# Patient Record
Sex: FEMALE | Race: WHITE | Employment: UNEMPLOYED | URBAN - NONMETROPOLITAN AREA
[De-identification: names, ages, dates, MRNs, and addresses within clinical notes are randomized per-mention and may not be internally consistent; named-entity substitution may affect disease eponyms.]

---

## 2020-07-24 ENCOUNTER — OFFICE VISIT (OUTPATIENT)
Dept: PODIATRY | Age: 75
End: 2020-07-24
Payer: MEDICARE

## 2020-07-24 VITALS
WEIGHT: 280 LBS | SYSTOLIC BLOOD PRESSURE: 118 MMHG | BODY MASS INDEX: 42.44 KG/M2 | HEART RATE: 65 BPM | DIASTOLIC BLOOD PRESSURE: 64 MMHG | HEIGHT: 68 IN

## 2020-07-24 PROCEDURE — 3046F HEMOGLOBIN A1C LEVEL >9.0%: CPT | Performed by: PODIATRIST

## 2020-07-24 PROCEDURE — G8428 CUR MEDS NOT DOCUMENT: HCPCS | Performed by: PODIATRIST

## 2020-07-24 PROCEDURE — 2022F DILAT RTA XM EVC RTNOPTHY: CPT | Performed by: PODIATRIST

## 2020-07-24 PROCEDURE — G8417 CALC BMI ABV UP PARAM F/U: HCPCS | Performed by: PODIATRIST

## 2020-07-24 PROCEDURE — 4040F PNEUMOC VAC/ADMIN/RCVD: CPT | Performed by: PODIATRIST

## 2020-07-24 PROCEDURE — G8400 PT W/DXA NO RESULTS DOC: HCPCS | Performed by: PODIATRIST

## 2020-07-24 PROCEDURE — 4004F PT TOBACCO SCREEN RCVD TLK: CPT | Performed by: PODIATRIST

## 2020-07-24 PROCEDURE — 1123F ACP DISCUSS/DSCN MKR DOCD: CPT | Performed by: PODIATRIST

## 2020-07-24 PROCEDURE — 3017F COLORECTAL CA SCREEN DOC REV: CPT | Performed by: PODIATRIST

## 2020-07-24 PROCEDURE — 1090F PRES/ABSN URINE INCON ASSESS: CPT | Performed by: PODIATRIST

## 2020-07-24 PROCEDURE — 11721 DEBRIDE NAIL 6 OR MORE: CPT | Performed by: PODIATRIST

## 2020-07-24 PROCEDURE — 99203 OFFICE O/P NEW LOW 30 MIN: CPT | Performed by: PODIATRIST

## 2020-07-24 RX ORDER — ALBUTEROL SULFATE 2.5 MG/3ML
2.5 SOLUTION RESPIRATORY (INHALATION) EVERY 6 HOURS PRN
COMMUNITY

## 2020-07-24 RX ORDER — FLUTICASONE PROPIONATE 220 UG/1
1 AEROSOL, METERED RESPIRATORY (INHALATION) 2 TIMES DAILY
COMMUNITY
Start: 2019-11-22

## 2020-07-24 RX ORDER — BUSPIRONE HYDROCHLORIDE 10 MG/1
10 TABLET ORAL 3 TIMES DAILY
COMMUNITY

## 2020-07-24 RX ORDER — ATORVASTATIN CALCIUM 20 MG/1
20 TABLET, FILM COATED ORAL DAILY
COMMUNITY

## 2020-07-24 RX ORDER — METOPROLOL TARTRATE 50 MG/1
50 TABLET, FILM COATED ORAL 2 TIMES DAILY
COMMUNITY

## 2020-07-24 RX ORDER — ALENDRONATE SODIUM 70 MG/1
70 TABLET ORAL WEEKLY
COMMUNITY
Start: 2019-10-24

## 2020-07-24 RX ORDER — LISINOPRIL 2.5 MG/1
2.5 TABLET ORAL DAILY
COMMUNITY

## 2020-07-24 RX ORDER — HUMAN INSULIN 100 [IU]/ML
INJECTION, SOLUTION SUBCUTANEOUS
COMMUNITY
Start: 2020-04-22

## 2020-07-24 RX ORDER — GABAPENTIN 300 MG/1
300 CAPSULE ORAL 2 TIMES DAILY
COMMUNITY

## 2020-07-24 RX ORDER — AMLODIPINE BESYLATE 5 MG/1
5 TABLET ORAL DAILY
COMMUNITY

## 2020-07-24 RX ORDER — ESCITALOPRAM OXALATE 10 MG/1
20 TABLET ORAL DAILY
COMMUNITY
Start: 2019-10-23

## 2020-07-24 RX ORDER — HYDROCHLOROTHIAZIDE 25 MG/1
25 TABLET ORAL DAILY
COMMUNITY

## 2020-07-24 NOTE — PROGRESS NOTES
Provider, MD   NOVOLIN R 100 UNIT/ML injection  4/22/20  Yes Historical Provider, MD   alendronate (FOSAMAX) 70 MG tablet Take 70 mg by mouth once a week 10/24/19  Yes Historical Provider, MD   Multiple Vitamins-Minerals (MULTIVITAMIN ADULT PO) Take 1 tablet by mouth daily   Yes Historical Provider, MD       Past Surgical History:   Procedure Laterality Date    CARPAL TUNNEL RELEASE Bilateral 2017    FINGER SURGERY Left     left thumb surgery    HEMORRHOID SURGERY      TOTAL HIP ARTHROPLASTY Right 2011    TOTAL KNEE ARTHROPLASTY Left 2015    WRIST SURGERY Right        History reviewed. No pertinent family history. Social History     Tobacco Use    Smoking status: Not on file    Smokeless tobacco: Never Used   Substance Use Topics    Alcohol use: Not on file       ROS: All 14 ROS systems reviewed and pertinent positives noted above, all others negative. Lower Extremity Physical Examination:     Vitals:   Vitals:    07/24/20 0943   BP: 118/64   Pulse: 65     General: AAO x 3 in NAD. Vascular: DP and PT pulses palpable 2/4, bilateral.  CFT <3 seconds, bilateral.  Hair growth absent to the level of the digits, bilateral.  Edema present, bilateral.  Varicosities present, bilateral. Erythema absent, bilateral. Distal Rubor absent bilateral.  Temperature decreased bilateral. Hyperpigmentation present bilateral. Atrophic skin no  Neurological: Sensation Impaired to light touch to level of digits, bilateral.  Protective sensation intact  10/10 sites via 5.07/10g [place] Monofilament, bilateral.  negative Tinel's, bilateral.  negative Valleix sign, bilateral.  Vibratory abnormal  bilateral.  Reflexes Decreased bilateral.  Paresthesias positive. Dysthesias negative.   Sharp/dull abnormal bilateral.  Musculoskeletal: Muscle strength 5/5, bilateral.  Pain absent upon palpation bilateral. Normal medial longitudinal arch, bilateral.  Ankle ROM decresed,bilateral.  1st MPJ ROM within normal limits, bilateral.  Dorsally contracted digits present. No other foot deformities. Integument:   Open lesion present, Right. superficial wound distal R hallux nail bed, non infected with new epithelail skin on edges. Interdigital maceration absent to web spaces absent, Bilateral.  Nails left 1, 2 and right 1, 2 thickened, dystrophic and crumbly, discolored with subungual debris. Fissures absent, Bilateral. Hyperkeratotic tissue is absent. Asessment: Patient is a 77 y.o. female with:    Diagnosis Orders   1. DM (diabetes mellitus), type 2 with neurological complications ([facility] [address]     2. Dermatophytosis of nail     3. Chronic venous insufficiency     4. Hammer toe, unspecified laterality     5. Open wound of toe, initial encounter         Plan: Patient examined and evaluated. Current condition and treatment options discussed in detail. DM foot ed and exam  Patient will use compression stockings on a regular basis. Any increase in swelling or redness or signs of ulceration will be seen back for further compression wrap therapy. Also advised importance of continued elevation of the leg. All nails as mentioned above debrided in length and thickness. Patient advised OTC methods for treatment of the mycotic nails. Patient will follow up in 10 weeks. Continue abx ointment OTC to R hallux wound qd. Looks to be healing well, see back in 1-2 weeks if not resolved. Offloading options and appropriate shoe gear for hammertoe deformity discussed. Contact office with any questions/problems/concerns.

## 2020-07-24 NOTE — PROGRESS NOTES
Foot Care Worksheet  PCP: Kamron Huston NP  Last visit: 07/13/2020    Nail description:  Thick , Yellow , Crumbly , Marked limitation of ambulation     Pain resulting from thickened and dystrophy of nail plate No    Nails involved  Right   1, 2  (T5-T9)  Left     1, 2  (TA-T4)    Q7 1 Class A Finding - Non traumatic amputation of foot No    Q8 2 Class B Findings - Absent DP pulse No, Absent PT pulse No, Advanced tropic changes (3 required) Yes    Decrease hair growth Yes, Nail changes/thickening Yes, Pigmented changes/discoloration Yes, Skin texture (thin, shiny) No, Skin color (rubor/redness) No  NoQ9 1 Class B and 2 Class C Findings  Claudication No, Temperature change Yes, Paresthesia Yes, Burning No, Edema Yes

## 2021-08-15 PROBLEM — M00.9 SEPTIC ARTHRITIS OF HIP (HCC): Status: ACTIVE | Noted: 2021-08-15

## 2022-09-04 LAB
ANION GAP SERPL CALCULATED.3IONS-SCNC: 11.7 MMOL/L
BACTERIA, URINE: ABNORMAL
BASOPHILS %: 1.22 (ref 0–3)
BASOPHILS ABSOLUTE: 0.13 (ref 0–0.3)
BILIRUBIN URINE: NEGATIVE
BLOOD, URINE: NEGATIVE
BUN BLDV-MCNC: 40 MG/DL (ref 7–17)
CALCIUM SERPL-MCNC: 8.5 MG/DL (ref 8.4–10.2)
CASTS UA: ABNORMAL
CHLORIDE BLD-SCNC: 96 MMOL/L (ref 98–120)
CLARITY: CLEAR
CO2: 30 MMOL/L (ref 22–31)
COLOR, URINE: YELLOW
CREAT SERPL-MCNC: 1.7 MG/DL (ref 0.5–1)
CREATINE KINASE-MB INDEX: 0.4 % (ref 0–5)
CREATINE KINASE-MB: <0.22 NG/ML (ref 0–2.4)
CREATINE KINASE: 56 UNITS/L (ref 25–170)
CREATININE CLEARANCE: 28.4
CRYSTALS, UA: ABNORMAL
EOSINOPHILS %: 3.29 (ref 0–10)
EOSINOPHILS ABSOLUTE: 0.34 (ref 0–1.1)
GFR CALCULATED: 31.1
GLUCOSE URINE: ABNORMAL MG/DL
GLUCOSE: 414 MG/DL (ref 65–105)
HCT VFR BLD CALC: 34.7 % (ref 37–47)
HEMOGLOBIN: 11.5 (ref 12–16)
KETONES, URINE: NEGATIVE MG/DL
LEUKOCYTE ESTERASE, URINE: NEGATIVE
LYMPHOCYTE %: 12.45 (ref 20–51.1)
LYMPHOCYTES ABSOLUTE: 1.28 (ref 1–5.5)
MAGNESIUM: 2.3 MG/DL (ref 1.6–2.3)
MCH RBC QN AUTO: 31.1 PG (ref 28.5–32.5)
MCHC RBC AUTO-ENTMCNC: 33.2 G/DL (ref 32–37)
MCV RBC AUTO: 93.7 FL (ref 80–94)
MONOCYTES %: 7.95 (ref 1.7–9.3)
MONOCYTES ABSOLUTE: 0.82 (ref 0.1–1)
MUCUS, URINE: ABNORMAL
MYOGLOBIN: 60.9 NG/ML (ref 20–100)
NEUTROPHILS %: 75.09 (ref 42.2–75.2)
NEUTROPHILS ABSOLUTE: 7.71 (ref 2–8.1)
NITRITE, URINE: NEGATIVE
PDW BLD-RTO: 13.9 % (ref 8.5–15.5)
PH UA: 6.5 (ref 5–8.5)
PLATELET # BLD: 242.5 THOU/MM3 (ref 130–400)
POTASSIUM SERPL-SCNC: 3.7 MMOL/L (ref 3.6–5)
PROTEIN UA: NEGATIVE MG/DL
RBC URINE: ABNORMAL (ref 0–2)
RBC: 3.7 M/UL (ref 4.2–5.4)
SODIUM BLD-SCNC: 134 MMOL/L (ref 135–145)
SPECIFIC GRAVITY, URINE: 1 MG/DL (ref 1–1.03)
SQUAMOUS EPITHELIAL: ABNORMAL
T4 FREE: 1.07 NG/DL (ref 0.78–2.19)
TRICHOMONAS, URINE: ABNORMAL
TROPONIN I: < 0.012 NG/ML (ref 0–0.03)
TSH REFLEX FT4: 0.32 MIU/ML (ref 0.49–4.67)
UROBILINOGEN, URINE: 0.2 MG/DL (ref 0.2–1)
WBC URINE: ABNORMAL (ref 0–4)
WBC: 10.3 THOU/ML3 (ref 4.8–10.8)
YEAST, URINE: ABNORMAL

## 2023-01-18 ENCOUNTER — OUTSIDE SERVICES (OUTPATIENT)
Dept: INTERNAL MEDICINE | Age: 78
End: 2023-01-18

## 2023-01-18 DIAGNOSIS — J45.909 UNCOMPLICATED ASTHMA, UNSPECIFIED ASTHMA SEVERITY, UNSPECIFIED WHETHER PERSISTENT: ICD-10-CM

## 2023-01-18 DIAGNOSIS — E78.5 HYPERLIPIDEMIA, UNSPECIFIED HYPERLIPIDEMIA TYPE: Primary | ICD-10-CM

## 2023-01-18 DIAGNOSIS — I25.10 CORONARY ARTERY DISEASE, UNSPECIFIED VESSEL OR LESION TYPE, UNSPECIFIED WHETHER ANGINA PRESENT, UNSPECIFIED WHETHER NATIVE OR TRANSPLANTED HEART: ICD-10-CM

## 2023-01-18 DIAGNOSIS — F32.9 MAJOR DEPRESSIVE DISORDER, REMISSION STATUS UNSPECIFIED, UNSPECIFIED WHETHER RECURRENT: ICD-10-CM

## 2023-01-18 DIAGNOSIS — N18.9 CHRONIC KIDNEY DISEASE, UNSPECIFIED CKD STAGE: ICD-10-CM

## 2023-01-18 DIAGNOSIS — H26.9 CATARACT, UNSPECIFIED CATARACT TYPE, UNSPECIFIED LATERALITY: ICD-10-CM

## 2023-01-21 PROBLEM — F32.9 MAJOR DEPRESSIVE DISORDER: Status: ACTIVE | Noted: 2023-01-21

## 2023-01-21 PROBLEM — E78.5 HYPERLIPIDEMIA: Status: ACTIVE | Noted: 2023-01-21

## 2023-01-21 PROBLEM — J45.909 UNCOMPLICATED ASTHMA: Status: ACTIVE | Noted: 2023-01-21

## 2023-01-21 PROBLEM — N18.9 CHRONIC KIDNEY DISEASE: Status: ACTIVE | Noted: 2023-01-21

## 2023-01-21 PROBLEM — I25.10 CORONARY ARTERY DISEASE: Status: ACTIVE | Noted: 2023-01-21

## 2023-01-21 PROBLEM — H26.9 CATARACT: Status: ACTIVE | Noted: 2023-01-21

## 2023-01-22 NOTE — PROGRESS NOTES
Date of Admission: 1/16/2023  Date of Encounter: 1/18/2023  Patient:  Yan Pepe  YOB: 1945      Chief Complaint: Admission to [facility] after treatment in the hospital for TIARRA    History of Present Illness:  She previously lived in assisted living, and experienced frequent falls, it seems that one of them passed quite significantly to her knee, and therefore she was transferred to the hospital, and was found to have TIARRA as well as hyponatremia. She is being admitted to [facility] for further rehabilitation. She seems to doing okay. Says that she still has pain in her knees, but it is better than before. Denies fever, chills. No falls since her admission to nursing home    Past Medical History: Hyperlipidemia, CKD, asthma  Past Family History: Noncontributory  Social History: Non-smoker, nondrinker at this time    Review of Systems:  Constitutional: Denies fever, chills  Cardiac: Denies chest pain, palpitations  Except as dictated above, all other systems reviewed and are negative     Physical Exam  Vitals: Blood pressure 132/75. Pulse 80. Respirations 15. Temperature 98.2  Constitutional: No acute distress  Eyes: No lid lag or proptosis. PERRLA  HENT: External ears normal. No lesions on the lips  Respiratory: Good air entry bilaterally. No wheezing or crackles  Cardiovascular: Normal S1-S2. No lower extremity edema  Gastrointestinal: No visible veins or masses. Good bowel sounds  Skin: No abnormal rashes. No digital cyanosis  Neurologic: Cranial nerves grossly intact. Sensation grossly intact  Psychiatric: Alert. Normal Affect. Assessments   Diagnosis Orders   1. Hyperlipidemia, unspecified hyperlipidemia type        2. Chronic kidney disease, unspecified CKD stage        3. Uncomplicated asthma, unspecified asthma severity, unspecified whether persistent        4.  Major depressive disorder, remission status unspecified, unspecified whether recurrent   5. Cataract, unspecified cataract type, unspecified laterality        6. Coronary artery disease, unspecified vessel or lesion type, unspecified whether angina present, unspecified whether native or transplanted heart            Plan  We will continue current medications unless otherwise noted.  I will be notified of any changes to the patient's condition.      This note has been created using the Epic electronic health record, and dictated in part by [employer] One dictation system. Despite the documenting physician's best efforts, there may be errors in spelling, grammar or syntax.

## 2023-02-15 ENCOUNTER — OUTSIDE SERVICES (OUTPATIENT)
Dept: INTERNAL MEDICINE | Age: 78
End: 2023-02-15
Payer: MEDICARE

## 2023-02-15 DIAGNOSIS — J45.909 UNCOMPLICATED ASTHMA, UNSPECIFIED ASTHMA SEVERITY, UNSPECIFIED WHETHER PERSISTENT: ICD-10-CM

## 2023-02-15 DIAGNOSIS — F32.9 MAJOR DEPRESSIVE DISORDER, REMISSION STATUS UNSPECIFIED, UNSPECIFIED WHETHER RECURRENT: ICD-10-CM

## 2023-02-15 DIAGNOSIS — E78.5 HYPERLIPIDEMIA, UNSPECIFIED HYPERLIPIDEMIA TYPE: Primary | ICD-10-CM

## 2023-02-15 DIAGNOSIS — N18.9 CHRONIC KIDNEY DISEASE, UNSPECIFIED CKD STAGE: ICD-10-CM

## 2023-02-15 DIAGNOSIS — I25.10 CORONARY ARTERY DISEASE, UNSPECIFIED VESSEL OR LESION TYPE, UNSPECIFIED WHETHER ANGINA PRESENT, UNSPECIFIED WHETHER NATIVE OR TRANSPLANTED HEART: ICD-10-CM

## 2023-02-15 DIAGNOSIS — H26.9 CATARACT, UNSPECIFIED CATARACT TYPE, UNSPECIFIED LATERALITY: ICD-10-CM

## 2023-02-15 PROCEDURE — 99309 SBSQ NF CARE MODERATE MDM 30: CPT | Performed by: INTERNAL MEDICINE

## 2023-02-16 NOTE — PROGRESS NOTES
Date of Encounter: 2/15/2023  Patient:  Fatimah Jimenez  YOB: 1945      Chief Complaint: Urinary incontinence    History of Present Illness:  She continues to have urinary incontinence, denies fever, dysuria, hematuria. Denies flank pain, nausea, vomiting. Past Medical/Surgical/Social History: Per admission note    Physical Exam  Vitals: Blood pressure 118/84. Pulse 84. Respirations 20. Temperature 97.3  Constitutional: No acute distress  Eyes: Sclerae nonicteric. No lid lag or proptosis  HENT: External ears normal. No external lesions on the nose. No lesions on the lips  Neck: No gross masses. Trachea visibly midline  Respiratory: Good air entry bilaterally. No wheezing or crackles  Cardiovascular: Normal S1-S2. No murmurs  Gastrointestinal: No visible masses. Skin: No abnormal rashes. No digital cyanosis  Neurologic: Cranial nerves grossly intact    Assessments   Diagnosis Orders   1. Hyperlipidemia, unspecified hyperlipidemia type        2. Chronic kidney disease, unspecified CKD stage        3. Uncomplicated asthma, unspecified asthma severity, unspecified whether persistent        4. Major depressive disorder, remission status unspecified, unspecified whether recurrent        5. Cataract, unspecified cataract type, unspecified laterality        6. Coronary artery disease, unspecified vessel or lesion type, unspecified whether angina present, unspecified whether native or transplanted heart            Plan  We will order UA to assess for infection  We will continue current medications unless otherwise noted. I will be notified of any changes to the patient's condition. This note has been created using the [employer] health record, and dictated in part by [employer] dictation system. Despite the documenting physician's best efforts, there may be errors in spelling, grammar or syntax.

## 2023-02-17 LAB
BACTERIA, URINE: ABNORMAL /HPF
BILIRUBIN URINE: NEGATIVE
BLOOD, URINE: ABNORMAL
CASTS UA: ABNORMAL /LPF
CLARITY: CLEAR
COLOR, URINE: YELLOW
CRYSTALS, UA: ABNORMAL
GLUCOSE URINE: ABNORMAL MG/DL
KETONES, URINE: NEGATIVE MG/DL
LEUKOCYTE ESTERASE, URINE: ABNORMAL
NITRITE, URINE: NEGATIVE
PH UA: 6.5 (ref 5–8.5)
PROTEIN UA: NEGATIVE MG/DL
RBC URINE: ABNORMAL /HPF (ref 0–2)
SPECIFIC GRAVITY, URINE: 1 MG/DL (ref 1–1.03)
SQUAMOUS EPITHELIAL: ABNORMAL /HPF
UROBILINOGEN, URINE: 0.2 MG/DL (ref 0.2–1)
WBC URINE: ABNORMAL /HPF (ref 0–4)

## 2023-03-15 ENCOUNTER — OUTSIDE SERVICES (OUTPATIENT)
Dept: INTERNAL MEDICINE | Age: 78
End: 2023-03-15

## 2023-03-22 ENCOUNTER — OUTSIDE SERVICES (OUTPATIENT)
Dept: INTERNAL MEDICINE | Age: 78
End: 2023-03-22

## 2023-03-22 DIAGNOSIS — F32.9 MAJOR DEPRESSIVE DISORDER, REMISSION STATUS UNSPECIFIED, UNSPECIFIED WHETHER RECURRENT: ICD-10-CM

## 2023-03-22 DIAGNOSIS — E78.5 HYPERLIPIDEMIA, UNSPECIFIED HYPERLIPIDEMIA TYPE: Primary | ICD-10-CM

## 2023-03-22 DIAGNOSIS — H26.9 CATARACT, UNSPECIFIED CATARACT TYPE, UNSPECIFIED LATERALITY: ICD-10-CM

## 2023-03-22 DIAGNOSIS — L89.90 PRESSURE INJURY OF SKIN, UNSPECIFIED INJURY STAGE, UNSPECIFIED LOCATION: ICD-10-CM

## 2023-03-22 DIAGNOSIS — I25.10 CORONARY ARTERY DISEASE, UNSPECIFIED VESSEL OR LESION TYPE, UNSPECIFIED WHETHER ANGINA PRESENT, UNSPECIFIED WHETHER NATIVE OR TRANSPLANTED HEART: ICD-10-CM

## 2023-03-22 DIAGNOSIS — J45.909 UNCOMPLICATED ASTHMA, UNSPECIFIED ASTHMA SEVERITY, UNSPECIFIED WHETHER PERSISTENT: ICD-10-CM

## 2023-03-22 DIAGNOSIS — N18.9 CHRONIC KIDNEY DISEASE, UNSPECIFIED CKD STAGE: ICD-10-CM

## 2023-03-26 NOTE — PROGRESS NOTES
attempt to order a new mattress. I counseled regarding the  need to move around to relieve the pressure. We will order Tylenol 1000 mg 3 times daily      This note has been created using the [employer] electronic health record, and dictated in part by [employer] dictation system. Despite the documenting physician's best efforts, there may be errors in spelling, grammar or syntax.

## 2023-05-05 LAB
ALBUMIN/GLOBULIN RATIO: 1.21 G/DL
ALBUMIN: 3.4 G/DL (ref 3.5–5)
ALP BLD-CCNC: 129 UNITS/L (ref 38–126)
ALT SERPL-CCNC: 12 UNITS/L (ref 4–35)
ANION GAP SERPL CALCULATED.3IONS-SCNC: 9.7 MMOL/L
AST SERPL-CCNC: 17 UNITS/L (ref 14–36)
BASOPHILS %: 1 (ref 0–3)
BASOPHILS ABSOLUTE: 0.08 (ref 0–0.3)
BILIRUB SERPL-MCNC: 0.7 MG/DL (ref 0.2–1.3)
BUN BLDV-MCNC: 17 MG/DL (ref 7–17)
CALCIUM SERPL-MCNC: 8.4 MG/DL (ref 8.4–10.2)
CHLORIDE BLD-SCNC: 94 MMOL/L (ref 98–120)
CHOLESTEROL/HDL RATIO: 2.37 RATIO (ref 0–4.5)
CHOLESTEROL: 116 MG/DL (ref 50–200)
CO2: 25 MMOL/L (ref 22–31)
CREAT SERPL-MCNC: 1.1 MG/DL (ref 0.5–1)
EOSINOPHILS %: 4.8 (ref 0–10)
EOSINOPHILS ABSOLUTE: 0.38 (ref 0–1.1)
GFR CALCULATED: 51.2
GLOBULIN: 2.8 G/DL
GLUCOSE: 220 MG/DL (ref 65–105)
HBA1C MFR BLD: 10.2 % (ref 4.4–6.4)
HCT VFR BLD CALC: 29.7 % (ref 37–47)
HDLC SERPL-MCNC: 49 MG/DL (ref 36–68)
HEMOGLOBIN: 9.9 (ref 12–16)
LDL CHOLESTEROL CALCULATED: 51.4 MG/DL (ref 0–160)
LYMPHOCYTE %: 14.41 (ref 20–51.1)
LYMPHOCYTES ABSOLUTE: 1.13 (ref 1–5.5)
MCH RBC QN AUTO: 29.2 PG (ref 28.5–32.5)
MCHC RBC AUTO-ENTMCNC: 33.4 G/DL (ref 32–37)
MCV RBC AUTO: 87.4 FL (ref 80–94)
MONOCYTES %: 11.5 (ref 1.7–9.3)
MONOCYTES ABSOLUTE: 0.91 (ref 0.1–1)
NEUTROPHILS %: 68.3 (ref 42.2–75.2)
NEUTROPHILS ABSOLUTE: 5.37 (ref 2–8.1)
PDW BLD-RTO: 14.3 % (ref 8.5–15.5)
PLATELET # BLD: 282.1 THOU/MM3 (ref 130–400)
POTASSIUM SERPL-SCNC: 4.7 MMOL/L (ref 3.6–5)
RBC: 3.4 M/UL (ref 4.2–5.4)
SODIUM BLD-SCNC: 124 MMOL/L (ref 135–145)
TOTAL PROTEIN, SERUM: 6.2 G/DL (ref 6.3–8.2)
TRIGL SERPL-MCNC: 78 MG/DL (ref 10–250)
VLDLC SERPL CALC-MCNC: 16 MG/DL (ref 0–50)
WBC: 7.9 THOU/ML3 (ref 4.8–10.8)

## 2023-05-17 ENCOUNTER — OUTSIDE SERVICES (OUTPATIENT)
Dept: INTERNAL MEDICINE | Age: 78
End: 2023-05-17

## 2023-05-17 DIAGNOSIS — H26.9 CATARACT, UNSPECIFIED CATARACT TYPE, UNSPECIFIED LATERALITY: ICD-10-CM

## 2023-05-17 DIAGNOSIS — J45.909 UNCOMPLICATED ASTHMA, UNSPECIFIED ASTHMA SEVERITY, UNSPECIFIED WHETHER PERSISTENT: ICD-10-CM

## 2023-05-17 DIAGNOSIS — I25.10 CORONARY ARTERY DISEASE, UNSPECIFIED VESSEL OR LESION TYPE, UNSPECIFIED WHETHER ANGINA PRESENT, UNSPECIFIED WHETHER NATIVE OR TRANSPLANTED HEART: ICD-10-CM

## 2023-05-17 DIAGNOSIS — E78.5 HYPERLIPIDEMIA, UNSPECIFIED HYPERLIPIDEMIA TYPE: Primary | ICD-10-CM

## 2023-05-17 DIAGNOSIS — N18.9 CHRONIC KIDNEY DISEASE, UNSPECIFIED CKD STAGE: ICD-10-CM

## 2023-05-17 DIAGNOSIS — F32.9 MAJOR DEPRESSIVE DISORDER, REMISSION STATUS UNSPECIFIED, UNSPECIFIED WHETHER RECURRENT: ICD-10-CM

## 2023-05-19 NOTE — PROGRESS NOTES
Date of Encounter: 5/17/2023  Patient:  Maury Philippe  YOB: 1945      Chief Complaint: Diabetes    History of Present Illness:  She recently had an A1c level done, which came back at 10.2. Dose insulin was increased. She has no complaints. No issues reported by nursing staff. Past Medical/Surgical/Social History: Per admission note    Physical Exam  Vitals: Blood pressure 119/76. Pulse 69. Temperature 98. Respirations 18  Constitutional: No acute distress  Eyes: Sclerae nonicteric. No lid lag or proptosis  HENT: External ears normal. No external lesions on the nose. No lesions on the lips  Neck: No gross masses. Trachea visibly midline  Respiratory: Good air entry bilaterally. No wheezing or crackles  Cardiovascular: Normal S1-S2. No murmurs  Gastrointestinal: No visible masses. Skin: No abnormal rashes. No digital cyanosis  Neurologic: Cranial nerves grossly intact    Assessments   Diagnosis Orders   1. Hyperlipidemia, unspecified hyperlipidemia type        2. Chronic kidney disease, unspecified CKD stage        3. Uncomplicated asthma, unspecified asthma severity, unspecified whether persistent        4. Major depressive disorder, remission status unspecified, unspecified whether recurrent        5. Coronary artery disease, unspecified vessel or lesion type, unspecified whether angina present, unspecified whether native or transplanted heart        6. Cataract, unspecified cataract type, unspecified laterality            Plan  We will continue current medications unless otherwise noted. I will be notified of any changes to the patient's condition. This note has been created using the [employer] health record, and dictated in part by [employer] dictation system. Despite the documenting physician's best efforts, there may be errors in spelling, grammar or syntax.

## 2023-07-12 ENCOUNTER — OUTSIDE SERVICES (OUTPATIENT)
Dept: INTERNAL MEDICINE | Age: 78
End: 2023-07-12
Payer: MEDICARE

## 2023-07-12 DIAGNOSIS — H26.9 CATARACT, UNSPECIFIED CATARACT TYPE, UNSPECIFIED LATERALITY: ICD-10-CM

## 2023-07-12 DIAGNOSIS — N18.9 CHRONIC KIDNEY DISEASE, UNSPECIFIED CKD STAGE: ICD-10-CM

## 2023-07-12 DIAGNOSIS — E78.5 HYPERLIPIDEMIA, UNSPECIFIED HYPERLIPIDEMIA TYPE: Primary | ICD-10-CM

## 2023-07-12 DIAGNOSIS — J45.909 UNCOMPLICATED ASTHMA, UNSPECIFIED ASTHMA SEVERITY, UNSPECIFIED WHETHER PERSISTENT: ICD-10-CM

## 2023-07-12 DIAGNOSIS — I25.10 CORONARY ARTERY DISEASE, UNSPECIFIED VESSEL OR LESION TYPE, UNSPECIFIED WHETHER ANGINA PRESENT, UNSPECIFIED WHETHER NATIVE OR TRANSPLANTED HEART: ICD-10-CM

## 2023-07-12 DIAGNOSIS — F32.9 MAJOR DEPRESSIVE DISORDER, REMISSION STATUS UNSPECIFIED, UNSPECIFIED WHETHER RECURRENT: ICD-10-CM

## 2023-07-12 PROCEDURE — 99309 SBSQ NF CARE MODERATE MDM 30: CPT | Performed by: INTERNAL MEDICINE

## 2023-07-12 NOTE — PROGRESS NOTES
Date of Encounter: 7/12/2023  Patient:  Madhu Pablo  YOB: 1945      Chief Complaint: Sciatica    History of Present Illness:  She reports that she has pain in her back radiates down to her right thigh. Says that is going on for the last several months, but it is getting worse. Denies fever, chills, weight loss, bowel/bladder incontinence. No recent trauma. Past Medical/Surgical/Social History: Per admission note    Physical Exam  Vitals: Blood pressure 142/88. Pulse 75. Temperature 97.2. Respirations 18  Constitutional: No acute distress  Eyes: Sclerae nonicteric. No lid lag or proptosis  HENT: External ears normal. No external lesions on the nose. No lesions on the lips  Neck: No gross masses. Trachea visibly midline  Respiratory: Good air entry bilaterally. No wheezing or crackles  Cardiovascular: Normal S1-S2. No murmurs  Gastrointestinal: No visible masses. Skin: No abnormal rashes. No digital cyanosis  Neurologic: Cranial nerves grossly intact    Assessments   Diagnosis Orders   1. Hyperlipidemia, unspecified hyperlipidemia type        2. Chronic kidney disease, unspecified CKD stage        3. Uncomplicated asthma, unspecified asthma severity, unspecified whether persistent        4. Major depressive disorder, remission status unspecified, unspecified whether recurrent        5. Coronary artery disease, unspecified vessel or lesion type, unspecified whether angina present, unspecified whether native or transplanted heart        6. Cataract, unspecified cataract type, unspecified laterality            Plan  We will start gabapentin 100 mg nightly. Reassess next visit. Will increase dose gradually  I will be notified of any changes to the patient's condition. Unless otherwise noted, given the stability of the condition, a gradual dose reduction in medications is not indicated at this time.       This note has been created using the [employer] health record, and

## 2023-07-18 ENCOUNTER — OFFICE VISIT (OUTPATIENT)
Dept: NEPHROLOGY | Age: 78
End: 2023-07-18
Payer: MEDICARE

## 2023-07-18 VITALS
HEIGHT: 68 IN | SYSTOLIC BLOOD PRESSURE: 128 MMHG | WEIGHT: 293 LBS | HEART RATE: 68 BPM | DIASTOLIC BLOOD PRESSURE: 84 MMHG | BODY MASS INDEX: 44.41 KG/M2

## 2023-07-18 DIAGNOSIS — D63.8 ANEMIA OF CHRONIC DISEASE: Primary | ICD-10-CM

## 2023-07-18 DIAGNOSIS — N18.31 STAGE 3A CHRONIC KIDNEY DISEASE (HCC): ICD-10-CM

## 2023-07-18 DIAGNOSIS — E88.09 HYPOALBUMINEMIA: ICD-10-CM

## 2023-07-18 DIAGNOSIS — E87.1 HYPONATREMIA: ICD-10-CM

## 2023-07-18 PROCEDURE — 99214 OFFICE O/P EST MOD 30 MIN: CPT | Performed by: INTERNAL MEDICINE

## 2023-07-18 PROCEDURE — 1123F ACP DISCUSS/DSCN MKR DOCD: CPT | Performed by: INTERNAL MEDICINE

## 2023-07-18 PROCEDURE — 99204 OFFICE O/P NEW MOD 45 MIN: CPT | Performed by: INTERNAL MEDICINE

## 2023-07-18 RX ORDER — MIDODRINE HYDROCHLORIDE 5 MG/1
5 TABLET ORAL 3 TIMES DAILY
COMMUNITY

## 2023-07-18 RX ORDER — MAGNESIUM OXIDE 400 MG/1
TABLET ORAL
COMMUNITY
Start: 2021-12-02

## 2023-07-18 RX ORDER — ROPINIROLE 2 MG/1
2 TABLET, FILM COATED ORAL NIGHTLY
COMMUNITY

## 2023-07-18 RX ORDER — SPIRONOLACTONE 25 MG/1
25 TABLET ORAL DAILY
COMMUNITY

## 2023-07-18 RX ORDER — INSULIN ASPART 100 [IU]/ML
INJECTION, SOLUTION INTRAVENOUS; SUBCUTANEOUS
COMMUNITY

## 2023-07-18 RX ORDER — POTASSIUM CHLORIDE 1.5 G/1.58G
20 POWDER, FOR SOLUTION ORAL DAILY
COMMUNITY

## 2023-07-18 RX ORDER — INSULIN GLARGINE 100 [IU]/ML
INJECTION, SOLUTION SUBCUTANEOUS NIGHTLY
COMMUNITY

## 2023-07-18 RX ORDER — LOPERAMIDE HYDROCHLORIDE 2 MG/1
2 CAPSULE ORAL PRN
COMMUNITY

## 2023-07-18 NOTE — PROGRESS NOTES
Renal Consult Note    Patient :  Penny Merlin; 69 y.o. MRN# [id number]    Reason for Consult:     Asked to see the patient because of abnormal labs. History of Present Illness:     Penny Merlin; 69 y.o. female with past medical history as mentioned below. She has a history of type 2 diabetes mellitus and a history of skin cancer. She has had quite a bit of variability in her lab data in the past.  She appeared to have an acute kidney injury episode in September 2022 for unknown reason. Her creatinine was 2 during the admission and went back to 1.6 mg/DL post discharge. Lab data from 5/5/2023 show sodium 124, potassium 4.7 chloride 94 and CO2 25 with a BUN of 17 and creatinine 1.1 with glucose 220. Calcium is 8.4. Liver function test within normal limits except for albumin and a bit low at 3.4 and alkaline phosphatase a bit high at 129. CBC showed white blood cell 7.9 with hemoglobin 9.9 and platelets 289. Lymphocyte percentage was low at 14.41 and monocyte percentage was high at 11.5 with neutrophil percentage 68.3 and eosinophils 4.8. Those labs were done at [facility].    Other past medical history includes morbid obesity with BMI 46.5 kg/m2, hypertension, hyperlipidemia, bone mineral disorder osteoporosis versus osteopenia, depression and apparently some issues with hypotension based on the fact the patient uses midodrine. Weight on this visit is 306 pounds up from 280 pounds measured 3 years ago. The patient is on numerous medications including magnesium oxide, loperamide, midodrine, spironolactone, Requip, potassium chloride, insulin, AER O inhaler, vitamin C, low-dose aspirin, albuterol, BuSpar, Lopressor, Lipitor, Lexapro, gabapentin, Fosamax and multivitamin. There are other medications listed including Flovent inhalation, HCTZ 25 mg oral daily, amlodipine 5 mg oral daily and lisinopril 2.5 mg oral daily that have all been discontinued.     Allergies to adhesive tape and

## 2023-08-17 LAB
ALBUMIN: 3.5 G/DL (ref 3.5–5)
ANION GAP SERPL CALCULATED.3IONS-SCNC: 14.2 MMOL/L (ref 4–12)
BASOPHILS %: 0.89 (ref 0–3)
BASOPHILS ABSOLUTE: 0.07 (ref 0–0.3)
BUN BLDV-MCNC: 22 MG/DL (ref 7–17)
CALCIUM SERPL-MCNC: 8.5 MG/DL (ref 8.4–10.2)
CHLORIDE BLD-SCNC: 92 MMOL/L (ref 98–120)
CO2: 24 MMOL/L (ref 22–31)
CREAT SERPL-MCNC: 1 MG/DL (ref 0.5–1)
EOSINOPHILS %: 5.17 (ref 0–10)
EOSINOPHILS ABSOLUTE: 0.38 (ref 0–1.1)
FERRITIN: 27.8 NG/DL (ref 10–282)
GFR CALCULATED: 57.1
GLUCOSE: 101 MG/DL (ref 65–105)
HCT VFR BLD CALC: 30.8 % (ref 37–47)
HEMOGLOBIN: 9 (ref 12–16)
IRON SATURATION: 11 % (ref 15–38)
IRON: 40 MG/DL (ref 37–170)
LYMPHOCYTE %: 14.76 (ref 20–51.1)
LYMPHOCYTES ABSOLUTE: 1.08 (ref 1–5.5)
MCH RBC QN AUTO: 26.3 PG (ref 28.5–32.5)
MCHC RBC AUTO-ENTMCNC: 29.3 G/DL (ref 32–37)
MCV RBC AUTO: 89.8 FL (ref 80–94)
MONOCYTES %: 10.17 (ref 1.7–9.3)
MONOCYTES ABSOLUTE: 0.75 (ref 0.1–1)
NEUTROPHILS %: 69.01 (ref 42.2–75.2)
NEUTROPHILS ABSOLUTE: 5.07 (ref 2–8.1)
PDW BLD-RTO: 13.6 % (ref 8.5–15.5)
PLATELET # BLD: 259.6 THOU/MM3 (ref 130–400)
POTASSIUM SERPL-SCNC: 5.2 MMOL/L (ref 3.6–5)
RBC: 3.43 M/UL (ref 4.2–5.4)
SODIUM BLD-SCNC: 125 MMOL/L (ref 135–145)
TOTAL IRON BINDING CAPACITY: 379 MG/DL (ref 261–497)
WBC: 7.3 THOU/ML3 (ref 4.8–10.8)

## 2023-08-23 ENCOUNTER — OUTSIDE SERVICES (OUTPATIENT)
Dept: INTERNAL MEDICINE | Age: 78
End: 2023-08-23
Payer: MEDICARE

## 2023-08-23 DIAGNOSIS — H26.9 CATARACT, UNSPECIFIED CATARACT TYPE, UNSPECIFIED LATERALITY: ICD-10-CM

## 2023-08-23 DIAGNOSIS — J45.909 UNCOMPLICATED ASTHMA, UNSPECIFIED ASTHMA SEVERITY, UNSPECIFIED WHETHER PERSISTENT: ICD-10-CM

## 2023-08-23 DIAGNOSIS — E11.40 TYPE 2 DIABETES MELLITUS WITH DIABETIC NEUROPATHY, UNSPECIFIED WHETHER LONG TERM INSULIN USE (HCC): ICD-10-CM

## 2023-08-23 DIAGNOSIS — E78.5 HYPERLIPIDEMIA, UNSPECIFIED HYPERLIPIDEMIA TYPE: Primary | ICD-10-CM

## 2023-08-23 DIAGNOSIS — F32.9 MAJOR DEPRESSIVE DISORDER, REMISSION STATUS UNSPECIFIED, UNSPECIFIED WHETHER RECURRENT: ICD-10-CM

## 2023-08-23 DIAGNOSIS — N18.9 CHRONIC KIDNEY DISEASE, UNSPECIFIED CKD STAGE: ICD-10-CM

## 2023-08-23 DIAGNOSIS — I25.10 CORONARY ARTERY DISEASE, UNSPECIFIED VESSEL OR LESION TYPE, UNSPECIFIED WHETHER ANGINA PRESENT, UNSPECIFIED WHETHER NATIVE OR TRANSPLANTED HEART: ICD-10-CM

## 2023-08-23 PROCEDURE — 99309 SBSQ NF CARE MODERATE MDM 30: CPT | Performed by: INTERNAL MEDICINE

## 2023-08-24 NOTE — PROGRESS NOTES
Date of Encounter: 8/23/2023  Patient:  Dipika Ramos  YOB: 1945      Chief Complaint: Dysuria    History of Present Illness:  Reports that she is not having dysuria for the last several days. Denies fever, chills, abdominal pain, nausea, vomiting. Past Medical/Surgical/Social History: Per admission note    Physical Exam  Vitals: Blood pressure 150/77. Pulse 71. Temperature 97.1. Constitutional: No acute distress  Eyes: Sclerae nonicteric. No lid lag or proptosis  HENT: External ears normal. No external lesions on the nose. No lesions on the lips  Neck: No gross masses. Trachea visibly midline  Respiratory: Good air entry bilaterally. No wheezing or crackles  Cardiovascular: Normal S1-S2. No murmurs  Gastrointestinal: No visible masses. Skin: No abnormal rashes. No digital cyanosis  Neurologic: Cranial nerves grossly intact  Foot exam: Decrease in station in both feet. No calluses. Pulses intact    Assessments   Diagnosis Orders   1. Hyperlipidemia, unspecified hyperlipidemia type        2. Chronic kidney disease, unspecified CKD stage        3. Uncomplicated asthma, unspecified asthma severity, unspecified whether persistent        4. Major depressive disorder, remission status unspecified, unspecified whether recurrent        5. Coronary artery disease, unspecified vessel or lesion type, unspecified whether angina present, unspecified whether native or transplanted heart        6. Cataract, unspecified cataract type, unspecified laterality            Plan  We will order diabetic shoes, given that she has decreased sensation in both feet. We will order UA, will treat infection if positive      This note has been created using the Epic electronic health record, and dictated in part by [address] [place] [address] dictation system. Despite the documenting physician's best efforts, there may be errors in spelling, grammar or syntax.

## 2023-08-28 LAB
BACTERIA, URINE: ABNORMAL /HPF
BILIRUBIN URINE: NEGATIVE
BLOOD, URINE: NEGATIVE
CASTS UA: ABNORMAL /LPF
CLARITY: ABNORMAL
COLOR, URINE: YELLOW
CRYSTALS, UA: ABNORMAL
GLUCOSE URINE: NEGATIVE MG/DL
KETONES, URINE: NEGATIVE MG/DL
LEUKOCYTE ESTERASE, URINE: ABNORMAL
NITRITE, URINE: NEGATIVE
PH UA: 7 (ref 5–8.5)
PROTEIN UA: NEGATIVE MG/DL
RBC URINE: ABNORMAL /HPF (ref 0–2)
SPECIFIC GRAVITY, URINE: 1.01 MG/DL (ref 1–1.03)
SQUAMOUS EPITHELIAL: ABNORMAL /HPF
UROBILINOGEN, URINE: 0.2 MG/DL (ref 0.2–1)
WBC URINE: ABNORMAL /HPF (ref 0–4)

## 2023-09-09 LAB
BACTERIA, URINE: ABNORMAL /HPF
BILIRUBIN URINE: NEGATIVE
BLOOD, URINE: ABNORMAL
CASTS UA: ABNORMAL /LPF
CLARITY: ABNORMAL
COLOR, URINE: YELLOW
CRYSTALS, UA: ABNORMAL
GLUCOSE URINE: NEGATIVE MG/DL
KETONES, URINE: NEGATIVE MG/DL
LEUKOCYTE ESTERASE, URINE: ABNORMAL
NITRITE, URINE: NEGATIVE
PH UA: 8 (ref 5–8.5)
PROTEIN UA: ABNORMAL MG/DL
RBC URINE: ABNORMAL /HPF (ref 0–2)
SPECIFIC GRAVITY, URINE: 1.01 MG/DL (ref 1–1.03)
SQUAMOUS EPITHELIAL: ABNORMAL /HPF
UROBILINOGEN, URINE: 0.2 MG/DL (ref 0.2–1)
WBC URINE: >100 /HPF (ref 0–4)

## 2023-09-14 DIAGNOSIS — E11.40 TYPE 2 DIABETES MELLITUS WITH DIABETIC NEUROPATHY, UNSPECIFIED WHETHER LONG TERM INSULIN USE (HCC): Primary | ICD-10-CM

## 2023-09-22 ENCOUNTER — TELEPHONE (OUTPATIENT)
Dept: INTERNAL MEDICINE | Age: 78
End: 2023-09-22

## 2023-09-27 ENCOUNTER — HOSPITAL ENCOUNTER (OUTPATIENT)
Age: 78
Discharge: HOME | End: 2023-09-27
Payer: MEDICARE

## 2023-09-27 ENCOUNTER — OFFICE VISIT (OUTPATIENT)
Dept: NEPHROLOGY | Age: 78
End: 2023-09-27
Payer: MEDICARE

## 2023-09-27 VITALS
HEIGHT: 68 IN | HEART RATE: 68 BPM | BODY MASS INDEX: 44.41 KG/M2 | WEIGHT: 293 LBS | SYSTOLIC BLOOD PRESSURE: 136 MMHG | DIASTOLIC BLOOD PRESSURE: 80 MMHG

## 2023-09-27 DIAGNOSIS — N30.00 ACUTE CYSTITIS WITHOUT HEMATURIA: ICD-10-CM

## 2023-09-27 DIAGNOSIS — E87.1 HYPONATREMIA: ICD-10-CM

## 2023-09-27 DIAGNOSIS — R82.81 PYURIA: ICD-10-CM

## 2023-09-27 DIAGNOSIS — N18.31 STAGE 3A CHRONIC KIDNEY DISEASE (HCC): ICD-10-CM

## 2023-09-27 DIAGNOSIS — D50.9 IRON DEFICIENCY ANEMIA, UNSPECIFIED IRON DEFICIENCY ANEMIA TYPE: Primary | ICD-10-CM

## 2023-09-27 DIAGNOSIS — E87.5 HYPERKALEMIA: ICD-10-CM

## 2023-09-27 PROCEDURE — 87077 CULTURE AEROBIC IDENTIFY: CPT

## 2023-09-27 PROCEDURE — 87186 SC STD MICRODIL/AGAR DIL: CPT

## 2023-09-27 PROCEDURE — 99214 OFFICE O/P EST MOD 30 MIN: CPT | Performed by: INTERNAL MEDICINE

## 2023-09-27 PROCEDURE — 1123F ACP DISCUSS/DSCN MKR DOCD: CPT | Performed by: INTERNAL MEDICINE

## 2023-09-27 PROCEDURE — 87086 URINE CULTURE/COLONY COUNT: CPT

## 2023-09-27 RX ORDER — LORATADINE 10 MG/1
10 TABLET ORAL DAILY
COMMUNITY

## 2023-09-27 RX ORDER — FERROUS SULFATE 325(65) MG
325 TABLET ORAL
Qty: 90 TABLET | Refills: 1 | Status: SHIPPED | OUTPATIENT
Start: 2023-09-27

## 2023-09-27 NOTE — PROGRESS NOTES
Renal Consult Note    Patient :  Juliann Mancilla; 69 y.o. MRN# [id number]    Reason for Consult:     Asked to see the patient because of abnormal labs. History of Present Illness:     Juliann Mancilla; 69 y.o. female with past medical history as mentioned below. She has a history of type 2 diabetes mellitus and a history of skin cancer. She has had quite a bit of variability in her lab data in the past.  She appeared to have an acute kidney injury episode in September 2022 for unknown reason. Her creatinine was 2 during the admission and went back to 1.6 mg/DL post discharge. Lab data from 5/5/2023 show sodium 124, potassium 4.7 chloride 94 and CO2 25 with a BUN of 17 and creatinine 1.1 with glucose 220. Calcium is 8.4. Liver function test within normal limits except for albumin and a bit low at 3.4 and alkaline phosphatase a bit high at 129. CBC showed white blood cell 7.9 with hemoglobin 9.9 and platelets 511. Lymphocyte percentage was low at 14.41 and monocyte percentage was high at 11.5 with neutrophil percentage 68.3 and eosinophils 4.8. Those labs were done at [facility].    After the last visit, on 8/17/2023, we checked iron studies which showed ferritin of 28, iron profile with iron saturation of 11, albumin 3.5 with sodium 125 potassium 5.2 chloride 92 and CO2 24. BUN was 22 and creatinine was down to 1 mg/dl, with estimated GFR 57 mL/min, continue to be chronic kidney disease stage IIIa. CBC showed white blood cells 7.3 with hemoglobin 9 and platelets 882.  5/5/4527 Dr. Scout Bradley ordered urinalysis with specific gravity 1.01 with 3+ leukocyte esterase negative nitrite trace protein and greater than 100 white blood cells and 2+ bacteria in the urine. She states she has been having symptoms of pain with urination, urinary urgency and frequency along with burning with urination.   She states that those symptoms have not improved with antibiotics, and she believes she is not receiving

## 2023-09-29 LAB
MICROORGANISM SPEC CULT: ABNORMAL
SPECIMEN DESCRIPTION: ABNORMAL

## 2023-10-03 LAB
BACTERIA, URINE: ABNORMAL /HPF
BILIRUBIN URINE: NEGATIVE
BLOOD, URINE: ABNORMAL
CASTS UA: ABNORMAL /LPF
CLARITY: ABNORMAL
COLOR, URINE: YELLOW
CRYSTALS, UA: ABNORMAL
GLUCOSE URINE: NEGATIVE MG/DL
KETONES, URINE: NEGATIVE MG/DL
LEUKOCYTE ESTERASE, URINE: ABNORMAL
NITRITE, URINE: POSITIVE
PH UA: 8 (ref 5–8.5)
PROTEIN UA: ABNORMAL MG/DL
RBC URINE: ABNORMAL /HPF (ref 0–2)
SPECIFIC GRAVITY, URINE: 1.02 MG/DL (ref 1–1.03)
SQUAMOUS EPITHELIAL: ABNORMAL /HPF
UROBILINOGEN, URINE: 0.2 MG/DL (ref 0.2–1)
WBC URINE: ABNORMAL /HPF (ref 0–4)

## 2023-10-18 ENCOUNTER — OUTSIDE SERVICES (OUTPATIENT)
Dept: INTERNAL MEDICINE | Age: 78
End: 2023-10-18
Payer: MEDICARE

## 2023-10-18 DIAGNOSIS — N18.9 CHRONIC KIDNEY DISEASE, UNSPECIFIED CKD STAGE: ICD-10-CM

## 2023-10-18 DIAGNOSIS — J45.909 UNCOMPLICATED ASTHMA, UNSPECIFIED ASTHMA SEVERITY, UNSPECIFIED WHETHER PERSISTENT: ICD-10-CM

## 2023-10-18 DIAGNOSIS — I25.10 CORONARY ARTERY DISEASE, UNSPECIFIED VESSEL OR LESION TYPE, UNSPECIFIED WHETHER ANGINA PRESENT, UNSPECIFIED WHETHER NATIVE OR TRANSPLANTED HEART: ICD-10-CM

## 2023-10-18 DIAGNOSIS — F32.9 MAJOR DEPRESSIVE DISORDER, REMISSION STATUS UNSPECIFIED, UNSPECIFIED WHETHER RECURRENT: ICD-10-CM

## 2023-10-18 DIAGNOSIS — E78.5 HYPERLIPIDEMIA, UNSPECIFIED HYPERLIPIDEMIA TYPE: Primary | ICD-10-CM

## 2023-10-18 DIAGNOSIS — H26.9 CATARACT, UNSPECIFIED CATARACT TYPE, UNSPECIFIED LATERALITY: ICD-10-CM

## 2023-10-18 DIAGNOSIS — E11.40 TYPE 2 DIABETES MELLITUS WITH DIABETIC NEUROPATHY, UNSPECIFIED WHETHER LONG TERM INSULIN USE (HCC): ICD-10-CM

## 2023-10-18 PROCEDURE — 99309 SBSQ NF CARE MODERATE MDM 30: CPT | Performed by: INTERNAL MEDICINE

## 2023-10-30 LAB
ANION GAP SERPL CALCULATED.3IONS-SCNC: 12.6 MMOL/L (ref 12–16)
BASOPHILS %: 1.32 (ref 0–3)
BASOPHILS ABSOLUTE: 0.11 (ref 0–0.3)
BUN BLDV-MCNC: 19 MG/DL (ref 7–17)
CALCIUM SERPL-MCNC: 9 MG/DL (ref 8.4–10.2)
CHLORIDE BLD-SCNC: 98 MMOL/L (ref 98–120)
CO2: 25 MMOL/L (ref 22–31)
CREAT SERPL-MCNC: 1 MG/DL (ref 0.5–1)
EOSINOPHILS %: 5.57 (ref 0–10)
EOSINOPHILS ABSOLUTE: 0.47 (ref 0–1.1)
GFR CALCULATED: 57.1
GLUCOSE: 151 MG/DL (ref 65–105)
HCT VFR BLD CALC: 31.4 % (ref 37–47)
HEMOGLOBIN: 9.8 (ref 12–16)
LYMPHOCYTE %: 17 (ref 20–51.1)
LYMPHOCYTES ABSOLUTE: 1.43 (ref 1–5.5)
MCH RBC QN AUTO: 29.3 PG (ref 28.5–32.5)
MCHC RBC AUTO-ENTMCNC: 31.2 G/DL (ref 32–37)
MCV RBC AUTO: 94 FL (ref 80–94)
MONOCYTES %: 10.51 (ref 1.7–9.3)
MONOCYTES ABSOLUTE: 0.88 (ref 0.1–1)
NEUTROPHILS %: 65.6 (ref 42.2–75.2)
NEUTROPHILS ABSOLUTE: 5.5 (ref 2–8.1)
PDW BLD-RTO: 13.9 % (ref 8.5–15.5)
PLATELET # BLD: 391.5 THOU/MM3 (ref 130–400)
POTASSIUM SERPL-SCNC: 5.6 MMOL/L (ref 3.6–5)
RBC: 3.35 M/UL (ref 4.2–5.4)
SODIUM BLD-SCNC: 130 MMOL/L (ref 135–145)
WBC: 8.4 THOU/ML3 (ref 4.8–10.8)

## 2023-11-13 LAB
ANION GAP SERPL CALCULATED.3IONS-SCNC: 14.9 MMOL/L (ref 12–16)
BASOPHILS %: 1.14 (ref 0–3)
BASOPHILS ABSOLUTE: 0.09 (ref 0–0.3)
BUN BLDV-MCNC: 22 MG/DL (ref 7–17)
CALCIUM SERPL-MCNC: 8.6 MG/DL (ref 8.4–10.2)
CHLORIDE BLD-SCNC: 92 MMOL/L (ref 98–120)
CO2: 27 MMOL/L (ref 22–31)
CREAT SERPL-MCNC: 1.1 MG/DL (ref 0.5–1)
EOSINOPHILS %: 5.52 (ref 0–10)
EOSINOPHILS ABSOLUTE: 0.43 (ref 0–1.1)
FERRITIN: 56.3 NG/DL (ref 10–282)
GFR CALCULATED: 51.2
GLUCOSE: 147 MG/DL (ref 65–105)
HCT VFR BLD CALC: 30.5 % (ref 37–47)
HEMOGLOBIN: 9.3 (ref 12–16)
LYMPHOCYTE %: 15.11 (ref 20–51.1)
LYMPHOCYTES ABSOLUTE: 1.17 (ref 1–5.5)
MCH RBC QN AUTO: 28.7 PG (ref 28.5–32.5)
MCHC RBC AUTO-ENTMCNC: 30.7 G/DL (ref 32–37)
MCV RBC AUTO: 93.8 FL (ref 80–94)
MONOCYTES %: 13.13 (ref 1.7–9.3)
MONOCYTES ABSOLUTE: 1.01 (ref 0.1–1)
NEUTROPHILS %: 65.09 (ref 42.2–75.2)
NEUTROPHILS ABSOLUTE: 5.02 (ref 2–8.1)
PDW BLD-RTO: 13.9 % (ref 8.5–15.5)
PLATELET # BLD: 284.8 THOU/MM3 (ref 130–400)
POTASSIUM SERPL-SCNC: 5.9 MMOL/L (ref 3.6–5)
RBC: 3.25 M/UL (ref 4.2–5.4)
SODIUM BLD-SCNC: 128 MMOL/L (ref 135–145)
TSH SERPL DL<=0.05 MIU/L-ACNC: 2.54 MIU/ML (ref 0.49–4.67)
VITAMIN B-12: 598 PG/ML (ref 239–931)
WBC: 7.7 THOU/ML3 (ref 4.8–10.8)

## 2023-11-22 ENCOUNTER — HOSPITAL ENCOUNTER (OUTPATIENT)
Age: 78
Discharge: HOME | End: 2023-11-22
Payer: MEDICARE

## 2023-11-22 ENCOUNTER — OFFICE VISIT (OUTPATIENT)
Dept: NEPHROLOGY | Age: 78
End: 2023-11-22
Payer: MEDICARE

## 2023-11-22 ENCOUNTER — OUTSIDE SERVICES (OUTPATIENT)
Dept: INTERNAL MEDICINE | Age: 78
End: 2023-11-22

## 2023-11-22 VITALS — DIASTOLIC BLOOD PRESSURE: 70 MMHG | SYSTOLIC BLOOD PRESSURE: 126 MMHG | HEART RATE: 68 BPM

## 2023-11-22 DIAGNOSIS — I25.10 CORONARY ARTERY DISEASE, UNSPECIFIED VESSEL OR LESION TYPE, UNSPECIFIED WHETHER ANGINA PRESENT, UNSPECIFIED WHETHER NATIVE OR TRANSPLANTED HEART: ICD-10-CM

## 2023-11-22 DIAGNOSIS — E87.1 HYPONATREMIA: ICD-10-CM

## 2023-11-22 DIAGNOSIS — E87.5 HYPERKALEMIA: Primary | ICD-10-CM

## 2023-11-22 DIAGNOSIS — F32.9 MAJOR DEPRESSIVE DISORDER, REMISSION STATUS UNSPECIFIED, UNSPECIFIED WHETHER RECURRENT: ICD-10-CM

## 2023-11-22 DIAGNOSIS — D50.9 IRON DEFICIENCY ANEMIA, UNSPECIFIED IRON DEFICIENCY ANEMIA TYPE: ICD-10-CM

## 2023-11-22 DIAGNOSIS — E78.5 HYPERLIPIDEMIA, UNSPECIFIED HYPERLIPIDEMIA TYPE: Primary | ICD-10-CM

## 2023-11-22 DIAGNOSIS — E11.40 TYPE 2 DIABETES MELLITUS WITH DIABETIC NEUROPATHY, UNSPECIFIED WHETHER LONG TERM INSULIN USE (HCC): ICD-10-CM

## 2023-11-22 DIAGNOSIS — J45.909 UNCOMPLICATED ASTHMA, UNSPECIFIED ASTHMA SEVERITY, UNSPECIFIED WHETHER PERSISTENT: ICD-10-CM

## 2023-11-22 DIAGNOSIS — N18.9 CHRONIC KIDNEY DISEASE, UNSPECIFIED CKD STAGE: ICD-10-CM

## 2023-11-22 DIAGNOSIS — D63.8 ANEMIA OF CHRONIC DISEASE: ICD-10-CM

## 2023-11-22 DIAGNOSIS — E87.5 HYPERKALEMIA: ICD-10-CM

## 2023-11-22 DIAGNOSIS — N18.31 STAGE 3A CHRONIC KIDNEY DISEASE (HCC): ICD-10-CM

## 2023-11-22 DIAGNOSIS — R82.81 PYURIA: ICD-10-CM

## 2023-11-22 DIAGNOSIS — R39.89 SUSPECTED UTI: ICD-10-CM

## 2023-11-22 DIAGNOSIS — H26.9 CATARACT, UNSPECIFIED CATARACT TYPE, UNSPECIFIED LATERALITY: ICD-10-CM

## 2023-11-22 LAB
ANION GAP SERPL CALCULATED.3IONS-SCNC: 9 MMOL/L (ref 9–17)
BASOPHILS # BLD: 0.06 K/UL (ref 0–0.2)
BASOPHILS NFR BLD: 1 % (ref 0–2)
BUN SERPL-MCNC: 22 MG/DL (ref 8–23)
BUN/CREAT SERPL: 18 (ref 9–20)
CALCIUM SERPL-MCNC: 9.1 MG/DL (ref 8.6–10.4)
CHLORIDE SERPL-SCNC: 93 MMOL/L (ref 98–107)
CO2 SERPL-SCNC: 25 MMOL/L (ref 20–31)
CREAT SERPL-MCNC: 1.2 MG/DL (ref 0.5–0.9)
EOSINOPHIL # BLD: 0.26 K/UL (ref 0–0.44)
EOSINOPHILS RELATIVE PERCENT: 3 % (ref 1–4)
ERYTHROCYTE [DISTWIDTH] IN BLOOD BY AUTOMATED COUNT: 13.9 % (ref 11.8–14.4)
GFR SERPL CREATININE-BSD FRML MDRD: 47 ML/MIN/1.73M2
GLUCOSE SERPL-MCNC: 241 MG/DL (ref 70–99)
HCT VFR BLD AUTO: 30.4 % (ref 36.3–47.1)
HGB BLD-MCNC: 9.7 G/DL (ref 11.9–15.1)
IMM GRANULOCYTES # BLD AUTO: 0.07 K/UL (ref 0–0.3)
IMM GRANULOCYTES NFR BLD: 1 %
LYMPHOCYTES NFR BLD: 0.67 K/UL (ref 1.1–3.7)
LYMPHOCYTES RELATIVE PERCENT: 6 % (ref 24–43)
MCH RBC QN AUTO: 29.9 PG (ref 25.2–33.5)
MCHC RBC AUTO-ENTMCNC: 31.9 G/DL (ref 25.2–33.5)
MCV RBC AUTO: 93.8 FL (ref 82.6–102.9)
MONOCYTES NFR BLD: 1.22 K/UL (ref 0.1–1.2)
MONOCYTES NFR BLD: 12 % (ref 3–12)
NEUTROPHILS NFR BLD: 77 % (ref 36–65)
NEUTS SEG NFR BLD: 8.3 K/UL (ref 1.5–8.1)
NRBC BLD-RTO: 0 PER 100 WBC
PLATELET # BLD AUTO: 329 K/UL (ref 138–453)
PMV BLD AUTO: 9 FL (ref 8.1–13.5)
POTASSIUM SERPL-SCNC: 5.6 MMOL/L (ref 3.7–5.3)
RBC # BLD AUTO: 3.24 M/UL (ref 3.95–5.11)
SODIUM SERPL-SCNC: 127 MMOL/L (ref 135–144)
WBC OTHER # BLD: 10.6 K/UL (ref 3.5–11.3)

## 2023-11-22 PROCEDURE — 86403 PARTICLE AGGLUT ANTBDY SCRN: CPT

## 2023-11-22 PROCEDURE — 1123F ACP DISCUSS/DSCN MKR DOCD: CPT | Performed by: INTERNAL MEDICINE

## 2023-11-22 PROCEDURE — 85025 COMPLETE CBC W/AUTO DIFF WBC: CPT

## 2023-11-22 PROCEDURE — 36415 COLL VENOUS BLD VENIPUNCTURE: CPT

## 2023-11-22 PROCEDURE — 80048 BASIC METABOLIC PNL TOTAL CA: CPT

## 2023-11-22 PROCEDURE — 99214 OFFICE O/P EST MOD 30 MIN: CPT | Performed by: INTERNAL MEDICINE

## 2023-11-22 PROCEDURE — 87086 URINE CULTURE/COLONY COUNT: CPT

## 2023-11-22 RX ORDER — SENNOSIDES A AND B 8.6 MG/1
1 TABLET, FILM COATED ORAL AS NEEDED
COMMUNITY

## 2023-11-22 RX ORDER — OXYBUTYNIN CHLORIDE 10 MG/1
10 TABLET, EXTENDED RELEASE ORAL DAILY
COMMUNITY

## 2023-11-22 RX ORDER — MAGNESIUM HYDROXIDE/ALUMINUM HYDROXICE/SIMETHICONE 120; 1200; 1200 MG/30ML; MG/30ML; MG/30ML
5 SUSPENSION ORAL EVERY 4 HOURS PRN
COMMUNITY

## 2023-11-22 RX ORDER — ACETAMINOPHEN 500 MG
500 TABLET ORAL EVERY 6 HOURS PRN
COMMUNITY

## 2023-11-22 NOTE — PROGRESS NOTES
Renal Consult Note    Patient :  Rahat Eller; 69 y.o. MRN# [id number]    Reason for Consult:     Asked to see the patient because of abnormal labs. History of Present Illness:     Rahat Eller; 69 y.o. female with past medical history as mentioned below. She has a history of type 2 diabetes mellitus and a history of skin cancer. She has had quite a bit of variability in her lab data in the past.  She appeared to have an acute kidney injury episode in September 2022 for unknown reason. Her creatinine was 2 during the admission and went back to 1.6 mg/DL post discharge. Lab data from November 2023 showed creatinine 1.1 for estimated GFR 51 mL/min. Sodium was 128 with potassium 5.9. Patient previously been on spironolactone which was discontinued. She previously was on hydrochlorothiazide which was discontinued. Blood glucose fair at 147. Last hemoglobin was 9.3 with ferritin 56. She is on an iron supplement. Ferritin has doubled from August to November with the iron supplement. Lab data from 5/5/2023 show sodium 124, potassium 4.7 chloride 94 and CO2 25 with a BUN of 17 and creatinine 1.1 with glucose 220. Calcium is 8.4. Liver function test within normal limits except for albumin and a bit low at 3.4 and alkaline phosphatase a bit high at 129. CBC showed white blood cell 7.9 with hemoglobin 9.9 and platelets 972. Lymphocyte percentage was low at 14.41 and monocyte percentage was high at 11.5 with neutrophil percentage 68.3 and eosinophils 4.8. Those labs were done at [facility].    No dysuria urgency or frequency and no significant nocturia. She is able to get up to the restroom with use of her walker. She did have a fall a couple months ago and hit her head but apparently no structural damage found when she was evaluated in the ER. She says her bowel habits are regular and are once to twice a day. Patient's medications are reviewed.       Other past medical

## 2023-11-23 LAB
MICROORGANISM SPEC CULT: ABNORMAL
SPECIMEN DESCRIPTION: ABNORMAL

## 2023-11-29 LAB
ALBUMIN/GLOBULIN RATIO: 1.29 G/DL
ALBUMIN: 3.1 G/DL (ref 3.5–5)
ALP BLD-CCNC: 100 UNITS/L (ref 38–126)
ALT SERPL-CCNC: 12 UNITS/L (ref 4–35)
ANION GAP SERPL CALCULATED.3IONS-SCNC: 13.2 MMOL/L (ref 12–16)
AST SERPL-CCNC: 17 UNITS/L (ref 14–36)
BILIRUB SERPL-MCNC: 0.6 MG/DL (ref 0.2–1.3)
BUN BLDV-MCNC: 19 MG/DL (ref 7–17)
CALCIUM SERPL-MCNC: 8.1 MG/DL (ref 8.4–10.2)
CHLORIDE BLD-SCNC: 94 MMOL/L (ref 98–120)
CO2: 24 MMOL/L (ref 22–31)
CREAT SERPL-MCNC: 0.9 MG/DL (ref 0.5–1)
GFR CALCULATED: > 60
GLOBULIN: 2.4 G/DL
GLUCOSE: 168 MG/DL (ref 65–105)
POTASSIUM SERPL-SCNC: 5.2 MMOL/L (ref 3.6–5)
SODIUM BLD-SCNC: 126 MMOL/L (ref 135–145)
TOTAL PROTEIN, SERUM: 5.5 G/DL (ref 6.3–8.2)

## 2024-01-03 LAB
ALBUMIN/GLOBULIN RATIO: 1.2 G/DL
ALBUMIN: 3.6 G/DL (ref 3.5–5)
ALP BLD-CCNC: 95 UNITS/L (ref 38–126)
ALT SERPL-CCNC: 12 UNITS/L (ref 4–35)
ANION GAP SERPL CALCULATED.3IONS-SCNC: 8.5 MMOL/L (ref 12–16)
AST SERPL-CCNC: 20 UNITS/L (ref 14–36)
BILIRUB SERPL-MCNC: 0.5 MG/DL (ref 0.2–1.3)
BUN BLDV-MCNC: 17 MG/DL (ref 7–17)
CALCIUM SERPL-MCNC: 8.6 MG/DL (ref 8.4–10.2)
CHLORIDE BLD-SCNC: 96 MMOL/L (ref 98–120)
CO2: 33 MMOL/L (ref 22–31)
CREAT SERPL-MCNC: 1.2 MG/DL (ref 0.5–1)
GFR CALCULATED: 46.3
GLOBULIN: 2.9 G/DL
GLUCOSE: 142 MG/DL (ref 65–105)
POTASSIUM SERPL-SCNC: 3.5 MMOL/L (ref 3.6–5)
SODIUM BLD-SCNC: 134 MMOL/L (ref 135–145)
TOTAL PROTEIN, SERUM: 6.5 G/DL (ref 6.3–8.2)

## 2024-01-10 ENCOUNTER — OUTSIDE SERVICES (OUTPATIENT)
Dept: INTERNAL MEDICINE | Age: 79
End: 2024-01-10

## 2024-01-10 DIAGNOSIS — I25.10 CORONARY ARTERY DISEASE, UNSPECIFIED VESSEL OR LESION TYPE, UNSPECIFIED WHETHER ANGINA PRESENT, UNSPECIFIED WHETHER NATIVE OR TRANSPLANTED HEART: ICD-10-CM

## 2024-01-10 DIAGNOSIS — E78.5 HYPERLIPIDEMIA, UNSPECIFIED HYPERLIPIDEMIA TYPE: Primary | ICD-10-CM

## 2024-01-10 DIAGNOSIS — F32.9 MAJOR DEPRESSIVE DISORDER, REMISSION STATUS UNSPECIFIED, UNSPECIFIED WHETHER RECURRENT: ICD-10-CM

## 2024-01-10 DIAGNOSIS — N18.9 CHRONIC KIDNEY DISEASE, UNSPECIFIED CKD STAGE: ICD-10-CM

## 2024-01-10 DIAGNOSIS — N18.31 STAGE 3A CHRONIC KIDNEY DISEASE (HCC): ICD-10-CM

## 2024-01-10 DIAGNOSIS — J45.909 UNCOMPLICATED ASTHMA, UNSPECIFIED ASTHMA SEVERITY, UNSPECIFIED WHETHER PERSISTENT: ICD-10-CM

## 2024-01-10 DIAGNOSIS — E11.40 TYPE 2 DIABETES MELLITUS WITH DIABETIC NEUROPATHY, UNSPECIFIED WHETHER LONG TERM INSULIN USE (HCC): ICD-10-CM

## 2024-01-14 PROBLEM — N18.31 STAGE 3A CHRONIC KIDNEY DISEASE (HCC): Status: ACTIVE | Noted: 2024-01-14

## 2024-02-07 ENCOUNTER — OUTSIDE SERVICES (OUTPATIENT)
Dept: INTERNAL MEDICINE | Age: 79
End: 2024-02-07
Payer: MEDICAID

## 2024-02-07 DIAGNOSIS — F32.9 MAJOR DEPRESSIVE DISORDER, REMISSION STATUS UNSPECIFIED, UNSPECIFIED WHETHER RECURRENT: ICD-10-CM

## 2024-02-07 DIAGNOSIS — N18.31 STAGE 3A CHRONIC KIDNEY DISEASE (HCC): ICD-10-CM

## 2024-02-07 DIAGNOSIS — J45.909 UNCOMPLICATED ASTHMA, UNSPECIFIED ASTHMA SEVERITY, UNSPECIFIED WHETHER PERSISTENT: ICD-10-CM

## 2024-02-07 DIAGNOSIS — E78.5 HYPERLIPIDEMIA, UNSPECIFIED HYPERLIPIDEMIA TYPE: Primary | ICD-10-CM

## 2024-02-07 DIAGNOSIS — N18.9 CHRONIC KIDNEY DISEASE, UNSPECIFIED CKD STAGE: ICD-10-CM

## 2024-02-07 DIAGNOSIS — I25.10 CORONARY ARTERY DISEASE, UNSPECIFIED VESSEL OR LESION TYPE, UNSPECIFIED WHETHER ANGINA PRESENT, UNSPECIFIED WHETHER NATIVE OR TRANSPLANTED HEART: ICD-10-CM

## 2024-02-07 DIAGNOSIS — E11.40 TYPE 2 DIABETES MELLITUS WITH DIABETIC NEUROPATHY, UNSPECIFIED WHETHER LONG TERM INSULIN USE (HCC): ICD-10-CM

## 2024-02-07 LAB
BACTERIA, URINE: ABNORMAL /HPF
BILIRUBIN URINE: NEGATIVE
BLOOD, URINE: ABNORMAL
CASTS UA: ABNORMAL /LPF
CLARITY: ABNORMAL
COLOR, URINE: YELLOW
CRYSTALS, UA: ABNORMAL
GLUCOSE URINE: NEGATIVE MG/DL
KETONES, URINE: NEGATIVE MG/DL
LEUKOCYTE ESTERASE, URINE: ABNORMAL
NITRITE, URINE: POSITIVE
PH UA: 8.5 (ref 5–8.5)
PROTEIN UA: ABNORMAL MG/DL
RBC URINE: ABNORMAL /HPF (ref 0–2)
SPECIFIC GRAVITY, URINE: 1.01 MG/DL (ref 1–1.03)
SQUAMOUS EPITHELIAL: ABNORMAL /HPF
UROBILINOGEN, URINE: 0.2 MG/DL (ref 0.2–1)
WBC URINE: ABNORMAL /HPF (ref 0–4)

## 2024-02-07 PROCEDURE — 99309 SBSQ NF CARE MODERATE MDM 30: CPT | Performed by: INTERNAL MEDICINE

## 2024-02-07 NOTE — PROGRESS NOTES
Date of Encounter: 2/7/2024  Patient:  Herminia Laura  YOB: 1945      Chief Complaint:  Sore throat    History of Present Illness:  Reports that she has been having sore throat for the last 4 days. Denies fever, chills, chest pain, SOB. Says that she has nasal congestion as well.    Past Medical/Surgical/Social History: Per admission note    Physical Exam  Vitals: Blood Pressure 142/61. Pulse 74. Temperature 97.3. Respirations 20  Constitutional: No acute distress  Eyes: Sclerae nonicteric. No lid lag or proptosis  HENT: Mild erythema on the throat  Neck: No gross masses. Trachea visibly midline  Respiratory: Good air entry bilaterally. No wheezing or crackles  Cardiovascular: Normal S1-S2. No murmurs  Gastrointestinal: No visible masses.   Skin: No abnormal rashes. No digital cyanosis  Neurologic: Cranial nerves grossly intact    Assessments   Diagnosis Orders   1. Hyperlipidemia, unspecified hyperlipidemia type        2. Stage 3a chronic kidney disease (HCC)        3. Type 2 diabetes mellitus with diabetic neuropathy, unspecified whether long term insulin use (HCC)        4. Chronic kidney disease, unspecified CKD stage        5. Uncomplicated asthma, unspecified asthma severity, unspecified whether persistent        6. Major depressive disorder, remission status unspecified, unspecified whether recurrent        7. Coronary artery disease, unspecified vessel or lesion type, unspecified whether angina present, unspecified whether native or transplanted heart            Plan  Likely has viral pharyngitis, will treat symptomatically with cepacol lozenges. Will consider abx if symptoms do not improve  I will be notified of any changes to the patient's condition. Unless otherwise noted, a gradual dose reduction in medications is not indicated at this time.      This note has been created using the Epic electronic health record, and dictated in part by [employer] One dictation system. Despite

## 2024-02-09 LAB
FLU A ANTIGEN: POSITIVE
FLU B ANTIGEN: NEGATIVE

## 2024-03-06 ENCOUNTER — OUTSIDE SERVICES (OUTPATIENT)
Dept: INTERNAL MEDICINE | Age: 79
End: 2024-03-06
Payer: MEDICAID

## 2024-03-06 DIAGNOSIS — E78.5 HYPERLIPIDEMIA, UNSPECIFIED HYPERLIPIDEMIA TYPE: Primary | ICD-10-CM

## 2024-03-06 DIAGNOSIS — J45.909 UNCOMPLICATED ASTHMA, UNSPECIFIED ASTHMA SEVERITY, UNSPECIFIED WHETHER PERSISTENT: ICD-10-CM

## 2024-03-06 DIAGNOSIS — N18.31 STAGE 3A CHRONIC KIDNEY DISEASE (HCC): ICD-10-CM

## 2024-03-06 DIAGNOSIS — I25.10 CORONARY ARTERY DISEASE, UNSPECIFIED VESSEL OR LESION TYPE, UNSPECIFIED WHETHER ANGINA PRESENT, UNSPECIFIED WHETHER NATIVE OR TRANSPLANTED HEART: ICD-10-CM

## 2024-03-06 DIAGNOSIS — E11.40 TYPE 2 DIABETES MELLITUS WITH DIABETIC NEUROPATHY, UNSPECIFIED WHETHER LONG TERM INSULIN USE (HCC): ICD-10-CM

## 2024-03-06 DIAGNOSIS — F32.9 MAJOR DEPRESSIVE DISORDER, REMISSION STATUS UNSPECIFIED, UNSPECIFIED WHETHER RECURRENT: ICD-10-CM

## 2024-03-06 PROCEDURE — 99309 SBSQ NF CARE MODERATE MDM 30: CPT | Performed by: INTERNAL MEDICINE

## 2024-03-06 NOTE — PROGRESS NOTES
Date of Encounter: 3/6/2024  Patient:  Herminia Laura  YOB: 1945      Chief Complaint:  Hemorrhoids    History of Present Illness:  She reports that she has hemorrhoids but she is not able to push back to place.  Says that it hurts when she has a bowel movement.  Also hurts to sit down for a long time.  Denies fever, chills.  She says that she previously had hemorrhoid surgery    Past Medical/Surgical/Social History: Per admission note    Physical Exam  Vitals: Blood Pressure 109/50. Pulse 71. Temperature 97.5. Respirations 18  Constitutional: No acute distress  Eyes: Sclerae nonicteric. No lid lag or proptosis  HENT: External ears normal. No external lesions on the nose. No lesions on the lips  Neck: No gross masses. Trachea visibly midline  Respiratory: Good air entry bilaterally. No wheezing or crackles  Cardiovascular: Normal S1-S2. No murmurs  Gastrointestinal: No visible masses.   Skin: No abnormal rashes. No digital cyanosis  Neurologic: Cranial nerves grossly intact    Assessments   Diagnosis Orders   1. Hyperlipidemia, unspecified hyperlipidemia type        2. Stage 3a chronic kidney disease (HCC)        3. Type 2 diabetes mellitus with diabetic neuropathy, unspecified whether long term insulin use ([facility])        4. Uncomplicated asthma, unspecified asthma severity, unspecified whether persistent        5. Major depressive disorder, remission status unspecified, unspecified whether recurrent        6. Coronary artery disease, unspecified vessel or lesion type, unspecified whether angina present, unspecified whether native or transplanted heart            Plan  Will refer to general surgery for hemorrhoid treatment.  She is not interested in creams at this time.  I will be notified of any changes to the patient's condition. Unless otherwise noted, a gradual dose reduction in medications is not indicated at this time.      This note has been created using the Epic electronic health

## 2024-04-03 ENCOUNTER — OUTSIDE SERVICES (OUTPATIENT)
Dept: INTERNAL MEDICINE | Age: 79
End: 2024-04-03
Payer: MEDICAID

## 2024-04-03 DIAGNOSIS — J45.909 UNCOMPLICATED ASTHMA, UNSPECIFIED ASTHMA SEVERITY, UNSPECIFIED WHETHER PERSISTENT: ICD-10-CM

## 2024-04-03 DIAGNOSIS — N18.9 CHRONIC KIDNEY DISEASE, UNSPECIFIED CKD STAGE: ICD-10-CM

## 2024-04-03 DIAGNOSIS — L84 CALLUS: ICD-10-CM

## 2024-04-03 DIAGNOSIS — H26.9 CATARACT, UNSPECIFIED CATARACT TYPE, UNSPECIFIED LATERALITY: ICD-10-CM

## 2024-04-03 DIAGNOSIS — I25.10 CORONARY ARTERY DISEASE, UNSPECIFIED VESSEL OR LESION TYPE, UNSPECIFIED WHETHER ANGINA PRESENT, UNSPECIFIED WHETHER NATIVE OR TRANSPLANTED HEART: ICD-10-CM

## 2024-04-03 DIAGNOSIS — N18.31 STAGE 3A CHRONIC KIDNEY DISEASE (HCC): ICD-10-CM

## 2024-04-03 DIAGNOSIS — E78.5 HYPERLIPIDEMIA, UNSPECIFIED HYPERLIPIDEMIA TYPE: Primary | ICD-10-CM

## 2024-04-03 DIAGNOSIS — E11.40 TYPE 2 DIABETES MELLITUS WITH DIABETIC NEUROPATHY, UNSPECIFIED WHETHER LONG TERM INSULIN USE (HCC): ICD-10-CM

## 2024-04-03 DIAGNOSIS — F32.9 MAJOR DEPRESSIVE DISORDER, REMISSION STATUS UNSPECIFIED, UNSPECIFIED WHETHER RECURRENT: ICD-10-CM

## 2024-04-03 PROCEDURE — 99309 SBSQ NF CARE MODERATE MDM 30: CPT | Performed by: INTERNAL MEDICINE

## 2024-04-04 NOTE — PROGRESS NOTES
Date of Encounter: 4/3/2024  Patient:  Herminia Laura  YOB: 1945      Chief Complaint: Follow up on chronic medical conditions    History of Present Illness:  She reports that she has a callus in her right foot.  She has seen podiatry already for it, and says that she has follow-up with the podiatrist.  She says that she can manage the pain at this time.  Denies fever, chills.  No other symptoms at this time.    Past Medical/Surgical/Social History: Per admission note    Physical Exam  Vitals: Blood Pressure 124/72. Pulse 73. Temperature 97.5. Respirations 17  Constitutional: No acute distress  Eyes: Sclerae nonicteric. No lid lag or proptosis  HENT: External ears normal. No external lesions on the nose. No lesions on the lips  Neck: No gross masses. Trachea visibly midline  Respiratory: Good air entry bilaterally. No wheezing or crackles  Cardiovascular: Normal S1-S2. No murmurs  Gastrointestinal: No visible masses.   Skin: No abnormal rashes. No digital cyanosis  Neurologic: Cranial nerves grossly intact    Assessments   Diagnosis Orders   1. Hyperlipidemia, unspecified hyperlipidemia type        2. Stage 3a chronic kidney disease (HCC)        3. Type 2 diabetes mellitus with diabetic neuropathy, unspecified whether long term insulin use (HCC)        4. Uncomplicated asthma, unspecified asthma severity, unspecified whether persistent        5. Major depressive disorder, remission status unspecified, unspecified whether recurrent        6. Chronic kidney disease, unspecified CKD stage        7. Coronary artery disease, unspecified vessel or lesion type, unspecified whether angina present, unspecified whether native or transplanted heart        8. Cataract, unspecified cataract type, unspecified laterality        9. Callus            Plan  Will defer management of callus to podiatrist  I will be notified of any changes to the patient's condition. Unless otherwise noted, a gradual dose

## 2024-04-24 ENCOUNTER — OUTSIDE SERVICES (OUTPATIENT)
Dept: INTERNAL MEDICINE | Age: 79
End: 2024-04-24
Payer: MEDICAID

## 2024-04-24 DIAGNOSIS — J45.909 UNCOMPLICATED ASTHMA, UNSPECIFIED ASTHMA SEVERITY, UNSPECIFIED WHETHER PERSISTENT: ICD-10-CM

## 2024-04-24 DIAGNOSIS — N18.31 STAGE 3A CHRONIC KIDNEY DISEASE (HCC): ICD-10-CM

## 2024-04-24 DIAGNOSIS — N18.9 CHRONIC KIDNEY DISEASE, UNSPECIFIED CKD STAGE: ICD-10-CM

## 2024-04-24 DIAGNOSIS — E11.40 TYPE 2 DIABETES MELLITUS WITH DIABETIC NEUROPATHY, UNSPECIFIED WHETHER LONG TERM INSULIN USE (HCC): ICD-10-CM

## 2024-04-24 DIAGNOSIS — I25.10 CORONARY ARTERY DISEASE, UNSPECIFIED VESSEL OR LESION TYPE, UNSPECIFIED WHETHER ANGINA PRESENT, UNSPECIFIED WHETHER NATIVE OR TRANSPLANTED HEART: ICD-10-CM

## 2024-04-24 DIAGNOSIS — E78.5 HYPERLIPIDEMIA, UNSPECIFIED HYPERLIPIDEMIA TYPE: Primary | ICD-10-CM

## 2024-04-24 DIAGNOSIS — F32.9 MAJOR DEPRESSIVE DISORDER, REMISSION STATUS UNSPECIFIED, UNSPECIFIED WHETHER RECURRENT: ICD-10-CM

## 2024-04-24 PROCEDURE — 99309 SBSQ NF CARE MODERATE MDM 30: CPT | Performed by: INTERNAL MEDICINE

## 2024-04-26 NOTE — PROGRESS NOTES
Date of Encounter: 4/24/2024  Patient:  Herminia Laura  YOB: 1945      Chief Complaint: Follow up on chronic medical conditions    History of Present Illness:  She has a history of neuropathy, for which she uses gabapentin.  She says that gabapentin helps, she still reports that she has pain in the bottom of her feet still.  Denies fever, chills.  No recent trauma.    Past Medical/Surgical/Social History: Per admission note    Physical Exam  Vitals: Blood Pressure 118/70. Pulse 76. Temperature 96.8. Respirations 18  Constitutional: No acute distress  Eyes: Sclerae nonicteric. No lid lag or proptosis  HENT: External ears normal. No external lesions on the nose. No lesions on the lips  Neck: No gross masses. Trachea visibly midline  Respiratory: Good air entry bilaterally. No wheezing or crackles  Cardiovascular: Normal S1-S2. No murmurs  Gastrointestinal: No visible masses.   Skin: No abnormal rashes. No digital cyanosis  Neurologic: Cranial nerves grossly intact    Assessments   Diagnosis Orders   1. Hyperlipidemia, unspecified hyperlipidemia type        2. Stage 3a chronic kidney disease (HCC)        3. Type 2 diabetes mellitus with diabetic neuropathy, unspecified whether long term insulin use (HCC)        4. Uncomplicated asthma, unspecified asthma severity, unspecified whether persistent        5. Major depressive disorder, remission status unspecified, unspecified whether recurrent        6. Chronic kidney disease, unspecified CKD stage        7. Coronary artery disease, unspecified vessel or lesion type, unspecified whether angina present, unspecified whether native or transplanted heart            Plan  Will increase gabapentin to 900 mg 3 times a day.  Reassess next visit.  I will be notified of any changes to the patient's condition. Unless otherwise noted, a gradual dose reduction in medications is not indicated at this time.      This note has been created using the Epic electronic

## 2024-05-22 ENCOUNTER — OUTSIDE SERVICES (OUTPATIENT)
Dept: INTERNAL MEDICINE | Age: 79
End: 2024-05-22
Payer: MEDICARE

## 2024-05-22 DIAGNOSIS — J45.909 UNCOMPLICATED ASTHMA, UNSPECIFIED ASTHMA SEVERITY, UNSPECIFIED WHETHER PERSISTENT: ICD-10-CM

## 2024-05-22 DIAGNOSIS — F32.9 MAJOR DEPRESSIVE DISORDER, REMISSION STATUS UNSPECIFIED, UNSPECIFIED WHETHER RECURRENT: ICD-10-CM

## 2024-05-22 DIAGNOSIS — N18.31 STAGE 3A CHRONIC KIDNEY DISEASE (HCC): ICD-10-CM

## 2024-05-22 DIAGNOSIS — I25.10 CORONARY ARTERY DISEASE, UNSPECIFIED VESSEL OR LESION TYPE, UNSPECIFIED WHETHER ANGINA PRESENT, UNSPECIFIED WHETHER NATIVE OR TRANSPLANTED HEART: ICD-10-CM

## 2024-05-22 DIAGNOSIS — H26.9 CATARACT, UNSPECIFIED CATARACT TYPE, UNSPECIFIED LATERALITY: ICD-10-CM

## 2024-05-22 DIAGNOSIS — E11.40 TYPE 2 DIABETES MELLITUS WITH DIABETIC NEUROPATHY, UNSPECIFIED WHETHER LONG TERM INSULIN USE (HCC): ICD-10-CM

## 2024-05-22 DIAGNOSIS — E78.5 HYPERLIPIDEMIA, UNSPECIFIED HYPERLIPIDEMIA TYPE: Primary | ICD-10-CM

## 2024-05-22 PROCEDURE — 99309 SBSQ NF CARE MODERATE MDM 30: CPT | Performed by: INTERNAL MEDICINE

## 2024-05-25 NOTE — PROGRESS NOTES
Date of Encounter: 5/22/2024  Patient:  Herminia Laura  YOB: 1945      Chief Complaint: Follow up on chronic medical conditions    History of Present Illness:  No issues reported by nursing staff. No reported complaints.      Past Medical/Surgical/Social History: Per admission note    Physical Exam  Vitals: Blood Pressure 126/64. Pulse 71. Temperature 98.5. Respirations 16  Constitutional: No acute distress  Eyes: Sclerae nonicteric. No lid lag or proptosis  HENT: External ears normal. No external lesions on the nose. No lesions on the lips  Neck: No gross masses. Trachea visibly midline  Respiratory: Good air entry bilaterally. No wheezing or crackles  Cardiovascular: Normal S1-S2. No murmurs  Gastrointestinal: No visible masses.   Skin: No abnormal rashes. No digital cyanosis  Neurologic: Cranial nerves grossly intact    Assessments   Diagnosis Orders   1. Hyperlipidemia, unspecified hyperlipidemia type        2. Stage 3a chronic kidney disease (HCC)        3. Type 2 diabetes mellitus with diabetic neuropathy, unspecified whether long term insulin use (HCC)        4. Uncomplicated asthma, unspecified asthma severity, unspecified whether persistent        5. Major depressive disorder, remission status unspecified, unspecified whether recurrent        6. Coronary artery disease, unspecified vessel or lesion type, unspecified whether angina present, unspecified whether native or transplanted heart        7. Cataract, unspecified cataract type, unspecified laterality            Plan  I will be notified of any changes to the patient's condition. Unless otherwise noted, a gradual dose reduction in medications is not indicated at this time.      This note has been created using the Epic electronic health record, and dictated in part by Dragon [employer] One dictation system. Despite the documenting physician's best efforts, there may be errors in spelling, grammar or syntax.

## 2024-06-14 LAB — LEGIONELLA AG, URINE: NEGATIVE

## 2024-06-19 ENCOUNTER — OUTSIDE SERVICES (OUTPATIENT)
Dept: INTERNAL MEDICINE | Age: 79
End: 2024-06-19

## 2024-06-19 ENCOUNTER — HOSPITAL ENCOUNTER (OUTPATIENT)
Age: 79
Discharge: HOME | End: 2024-06-19
Payer: MEDICARE

## 2024-06-19 ENCOUNTER — OFFICE VISIT (OUTPATIENT)
Dept: NEPHROLOGY | Age: 79
End: 2024-06-19
Payer: MEDICARE

## 2024-06-19 VITALS
HEIGHT: 68 IN | BODY MASS INDEX: 46.53 KG/M2 | HEART RATE: 64 BPM | DIASTOLIC BLOOD PRESSURE: 72 MMHG | SYSTOLIC BLOOD PRESSURE: 124 MMHG

## 2024-06-19 DIAGNOSIS — F32.9 MAJOR DEPRESSIVE DISORDER, REMISSION STATUS UNSPECIFIED, UNSPECIFIED WHETHER RECURRENT: ICD-10-CM

## 2024-06-19 DIAGNOSIS — D63.8 ANEMIA OF CHRONIC DISEASE: ICD-10-CM

## 2024-06-19 DIAGNOSIS — I25.10 CORONARY ARTERY DISEASE, UNSPECIFIED VESSEL OR LESION TYPE, UNSPECIFIED WHETHER ANGINA PRESENT, UNSPECIFIED WHETHER NATIVE OR TRANSPLANTED HEART: ICD-10-CM

## 2024-06-19 DIAGNOSIS — E87.1 HYPONATREMIA: ICD-10-CM

## 2024-06-19 DIAGNOSIS — E87.5 HYPERKALEMIA: ICD-10-CM

## 2024-06-19 DIAGNOSIS — E78.5 HYPERLIPIDEMIA, UNSPECIFIED HYPERLIPIDEMIA TYPE: Primary | ICD-10-CM

## 2024-06-19 DIAGNOSIS — E11.40 TYPE 2 DIABETES MELLITUS WITH DIABETIC NEUROPATHY, UNSPECIFIED WHETHER LONG TERM INSULIN USE (HCC): ICD-10-CM

## 2024-06-19 DIAGNOSIS — J45.909 UNCOMPLICATED ASTHMA, UNSPECIFIED ASTHMA SEVERITY, UNSPECIFIED WHETHER PERSISTENT: ICD-10-CM

## 2024-06-19 DIAGNOSIS — N18.31 STAGE 3A CHRONIC KIDNEY DISEASE (HCC): ICD-10-CM

## 2024-06-19 DIAGNOSIS — N18.31 STAGE 3A CHRONIC KIDNEY DISEASE (HCC): Primary | ICD-10-CM

## 2024-06-19 DIAGNOSIS — R60.0 EDEMA OF BOTH LOWER EXTREMITIES: ICD-10-CM

## 2024-06-19 LAB
25(OH)D3 SERPL-MCNC: 16 NG/ML (ref 30–100)
ALBUMIN SERPL-MCNC: 4.1 G/DL (ref 3.5–5.2)
ALBUMIN/GLOB SERPL: 1.4 {RATIO} (ref 1–2.5)
ALP SERPL-CCNC: 130 U/L (ref 35–104)
ALT SERPL-CCNC: 7 U/L (ref 5–33)
ANION GAP SERPL CALCULATED.3IONS-SCNC: 10 MMOL/L (ref 9–17)
AST SERPL-CCNC: 13 U/L
BASOPHILS # BLD: 0.04 K/UL (ref 0–0.2)
BASOPHILS NFR BLD: 1 % (ref 0–2)
BILIRUB DIRECT SERPL-MCNC: 0.1 MG/DL
BILIRUB INDIRECT SERPL-MCNC: 0.3 MG/DL (ref 0–1)
BILIRUB SERPL-MCNC: 0.4 MG/DL (ref 0.3–1.2)
BUN SERPL-MCNC: 26 MG/DL (ref 8–23)
CALCIUM SERPL-MCNC: 9.5 MG/DL (ref 8.6–10.4)
CHLORIDE SERPL-SCNC: 95 MMOL/L (ref 98–107)
CO2 SERPL-SCNC: 30 MMOL/L (ref 20–31)
CREAT SERPL-MCNC: 1.1 MG/DL (ref 0.5–0.9)
EOSINOPHIL # BLD: 0.41 K/UL (ref 0–0.44)
EOSINOPHILS RELATIVE PERCENT: 5 % (ref 1–4)
ERYTHROCYTE [DISTWIDTH] IN BLOOD BY AUTOMATED COUNT: 14 % (ref 11.8–14.4)
GFR, ESTIMATED: 51 ML/MIN/1.73M2
GLUCOSE SERPL-MCNC: 145 MG/DL (ref 70–99)
HCT VFR BLD AUTO: 31.8 % (ref 36.3–47.1)
HGB BLD-MCNC: 9.9 G/DL (ref 11.9–15.1)
IMM GRANULOCYTES # BLD AUTO: <0.03 K/UL (ref 0–0.3)
IMM GRANULOCYTES NFR BLD: 0 %
LYMPHOCYTES NFR BLD: 0.99 K/UL (ref 1.1–3.7)
LYMPHOCYTES RELATIVE PERCENT: 12 % (ref 24–43)
MCH RBC QN AUTO: 29.3 PG (ref 25.2–33.5)
MCHC RBC AUTO-ENTMCNC: 31.1 G/DL (ref 25.2–33.5)
MCV RBC AUTO: 94.1 FL (ref 82.6–102.9)
MONOCYTES NFR BLD: 0.64 K/UL (ref 0.1–1.2)
MONOCYTES NFR BLD: 8 % (ref 3–12)
NEUTROPHILS NFR BLD: 74 % (ref 36–65)
NEUTS SEG NFR BLD: 5.99 K/UL (ref 1.5–8.1)
NRBC BLD-RTO: 0 PER 100 WBC
PHOSPHATE SERPL-MCNC: 3.9 MG/DL (ref 2.6–4.5)
PLATELET # BLD AUTO: 305 K/UL (ref 138–453)
PMV BLD AUTO: 9.9 FL (ref 8.1–13.5)
POTASSIUM SERPL-SCNC: 4.3 MMOL/L (ref 3.7–5.3)
PROT SERPL-MCNC: 7.1 G/DL (ref 6.4–8.3)
PTH-INTACT SERPL-MCNC: 75 PG/ML (ref 15–65)
RBC # BLD AUTO: 3.38 M/UL (ref 3.95–5.11)
SODIUM SERPL-SCNC: 135 MMOL/L (ref 135–144)
URATE SERPL-MCNC: 7.9 MG/DL (ref 2.4–5.7)
WBC OTHER # BLD: 8.1 K/UL (ref 3.5–11.3)

## 2024-06-19 PROCEDURE — 99213 OFFICE O/P EST LOW 20 MIN: CPT | Performed by: INTERNAL MEDICINE

## 2024-06-19 PROCEDURE — 83970 ASSAY OF PARATHORMONE: CPT

## 2024-06-19 PROCEDURE — 36415 COLL VENOUS BLD VENIPUNCTURE: CPT

## 2024-06-19 PROCEDURE — 85025 COMPLETE CBC W/AUTO DIFF WBC: CPT

## 2024-06-19 PROCEDURE — 99214 OFFICE O/P EST MOD 30 MIN: CPT

## 2024-06-19 PROCEDURE — 1123F ACP DISCUSS/DSCN MKR DOCD: CPT | Performed by: INTERNAL MEDICINE

## 2024-06-19 PROCEDURE — 82306 VITAMIN D 25 HYDROXY: CPT

## 2024-06-19 PROCEDURE — 80053 COMPREHEN METABOLIC PANEL: CPT

## 2024-06-19 PROCEDURE — 84100 ASSAY OF PHOSPHORUS: CPT

## 2024-06-19 PROCEDURE — 84550 ASSAY OF BLOOD/URIC ACID: CPT

## 2024-06-19 PROCEDURE — 82248 BILIRUBIN DIRECT: CPT

## 2024-06-19 RX ORDER — FUROSEMIDE 40 MG/1
40 TABLET ORAL 2 TIMES DAILY
COMMUNITY

## 2024-06-19 NOTE — PROGRESS NOTES
level today   2.  No medication changes at this time with medications reviewed  3.   Return to see me in 6 months.     Thank you for allowing me to see this very pleasant patient in nephrology return visit    Jarod Westbrook MD  [facility]  6/19/2024

## 2024-07-17 ENCOUNTER — OUTSIDE SERVICES (OUTPATIENT)
Dept: INTERNAL MEDICINE | Age: 79
End: 2024-07-17
Payer: MEDICARE

## 2024-07-17 DIAGNOSIS — N18.31 STAGE 3A CHRONIC KIDNEY DISEASE (HCC): ICD-10-CM

## 2024-07-17 DIAGNOSIS — E11.40 TYPE 2 DIABETES MELLITUS WITH DIABETIC NEUROPATHY, UNSPECIFIED WHETHER LONG TERM INSULIN USE (HCC): ICD-10-CM

## 2024-07-17 DIAGNOSIS — J45.909 UNCOMPLICATED ASTHMA, UNSPECIFIED ASTHMA SEVERITY, UNSPECIFIED WHETHER PERSISTENT: ICD-10-CM

## 2024-07-17 DIAGNOSIS — I25.10 CORONARY ARTERY DISEASE, UNSPECIFIED VESSEL OR LESION TYPE, UNSPECIFIED WHETHER ANGINA PRESENT, UNSPECIFIED WHETHER NATIVE OR TRANSPLANTED HEART: ICD-10-CM

## 2024-07-17 DIAGNOSIS — E78.5 HYPERLIPIDEMIA, UNSPECIFIED HYPERLIPIDEMIA TYPE: Primary | ICD-10-CM

## 2024-07-17 DIAGNOSIS — F32.9 MAJOR DEPRESSIVE DISORDER, REMISSION STATUS UNSPECIFIED, UNSPECIFIED WHETHER RECURRENT: ICD-10-CM

## 2024-07-17 PROCEDURE — 99309 SBSQ NF CARE MODERATE MDM 30: CPT | Performed by: INTERNAL MEDICINE

## 2024-07-19 NOTE — PROGRESS NOTES
Date of Encounter: 7/17/2024  Patient:  Herminia Laura  YOB: 1945      Chief Complaint: Follow up on chronic medical conditions    History of Present Illness:  She comes in after hemorrhoidectomy that she underwent today, says that she is sore at the site of surgery.  However, denies fever, chills, abdominal pain.    Past Medical/Surgical/Social History: Per admission note    Physical Exam  Vitals: Blood Pressure 116/72. Pulse 73. Temperature 97.4. Respirations 18  Constitutional: No acute distress  Eyes: Sclerae nonicteric. No lid lag or proptosis  HENT: External ears normal. No external lesions on the nose. No lesions on the lips  Neck: No gross masses. Trachea visibly midline  Respiratory: Good air entry bilaterally. No wheezing or crackles  Cardiovascular: Normal S1-S2. No murmurs  Gastrointestinal: No visible masses.   Skin: No abnormal rashes. No digital cyanosis  Neurologic: Cranial nerves grossly intact    Assessments   Diagnosis Orders   1. Hyperlipidemia, unspecified hyperlipidemia type        2. Stage 3a chronic kidney disease (HCC)        3. Type 2 diabetes mellitus with diabetic neuropathy, unspecified whether long term insulin use (HCC)        4. Uncomplicated asthma, unspecified asthma severity, unspecified whether persistent        5. Major depressive disorder, remission status unspecified, unspecified whether recurrent        6. Coronary artery disease, unspecified vessel or lesion type, unspecified whether angina present, unspecified whether native or transplanted heart            Plan  I will be notified of any changes to the patient's condition. Unless otherwise noted, a gradual dose reduction in medications is not indicated at this time.      This note has been created using the Epic electronic health record, and dictated in part by Dragon [employer] One dictation system. Despite the documenting physician's best efforts, there may be errors in spelling, grammar or syntax.

## 2024-08-14 ENCOUNTER — OUTSIDE SERVICES (OUTPATIENT)
Dept: INTERNAL MEDICINE | Age: 79
End: 2024-08-14
Payer: MEDICARE

## 2024-08-14 DIAGNOSIS — J45.909 UNCOMPLICATED ASTHMA, UNSPECIFIED ASTHMA SEVERITY, UNSPECIFIED WHETHER PERSISTENT: ICD-10-CM

## 2024-08-14 DIAGNOSIS — I25.10 CORONARY ARTERY DISEASE, UNSPECIFIED VESSEL OR LESION TYPE, UNSPECIFIED WHETHER ANGINA PRESENT, UNSPECIFIED WHETHER NATIVE OR TRANSPLANTED HEART: ICD-10-CM

## 2024-08-14 DIAGNOSIS — E11.40 TYPE 2 DIABETES MELLITUS WITH DIABETIC NEUROPATHY, UNSPECIFIED WHETHER LONG TERM INSULIN USE (HCC): ICD-10-CM

## 2024-08-14 DIAGNOSIS — F32.9 MAJOR DEPRESSIVE DISORDER, REMISSION STATUS UNSPECIFIED, UNSPECIFIED WHETHER RECURRENT: ICD-10-CM

## 2024-08-14 DIAGNOSIS — N18.31 STAGE 3A CHRONIC KIDNEY DISEASE (HCC): ICD-10-CM

## 2024-08-14 DIAGNOSIS — E78.5 HYPERLIPIDEMIA, UNSPECIFIED HYPERLIPIDEMIA TYPE: Primary | ICD-10-CM

## 2024-08-14 PROCEDURE — 99309 SBSQ NF CARE MODERATE MDM 30: CPT | Performed by: INTERNAL MEDICINE

## 2024-08-15 NOTE — PROGRESS NOTES
Date of Encounter: 8/14/2024  Patient:  Herminia Laura  YOB: 1945      Chief Complaint: Follow up on chronic medical conditions    History of Present Illness:  No issues reported by nursing staff. No reported complaints. She was painting today her room    Past Medical/Surgical/Social History: Per admission note    Physical Exam  Vitals: Blood Pressure 113/72. Pulse 77. Temperature 97.4. Respirations 18  Constitutional: No acute distress  Eyes: Sclerae nonicteric. No lid lag or proptosis  HENT: External ears normal. No external lesions on the nose. No lesions on the lips  Neck: No gross masses. Trachea visibly midline  Respiratory: Good air entry bilaterally. No wheezing or crackles  Cardiovascular: Normal S1-S2. No murmurs  Gastrointestinal: No visible masses.   Skin: No abnormal rashes. No digital cyanosis  Neurologic: Cranial nerves grossly intact    Assessments   Diagnosis Orders   1. Hyperlipidemia, unspecified hyperlipidemia type        2. Stage 3a chronic kidney disease (HCC)        3. Type 2 diabetes mellitus with diabetic neuropathy, unspecified whether long term insulin use (HCC)        4. Uncomplicated asthma, unspecified asthma severity, unspecified whether persistent        5. Major depressive disorder, remission status unspecified, unspecified whether recurrent        6. Coronary artery disease, unspecified vessel or lesion type, unspecified whether angina present, unspecified whether native or transplanted heart            Plan  I will be notified of any changes to the patient's condition. Unless otherwise noted, a gradual dose reduction in medications is not indicated at this time.      This note has been created using the Epic electronic health record, and dictated in part by [employer] One dictation system. Despite the documenting physician's best efforts, there may be errors in spelling, grammar or syntax.

## 2024-08-22 LAB
BACTERIA, URINE: ABNORMAL /HPF
BILIRUBIN, URINE: NEGATIVE
BLOOD, URINE: ABNORMAL
CASTS UA: ABNORMAL /LPF
CLARITY, UA: ABNORMAL
COLOR, UA: YELLOW
CRYSTALS, UA: ABNORMAL
GLUCOSE URINE: NEGATIVE MG/DL
KETONES, URINE: NEGATIVE MG/DL
LEUKOCYTE ESTERASE, URINE: ABNORMAL
NITRITE, URINE: POSITIVE
PH, URINE: 6 (ref 5–8.5)
PROTEIN UA: ABNORMAL MG/DL
RBC URINE: >100 /HPF (ref 0–2)
SPECIFIC GRAVITY UA: 1.01 MG/DL (ref 1–1.03)
SQUAMOUS EPITHELIAL: ABNORMAL /HPF
UROBILINOGEN, URINE: 0.2 MG/DL (ref 0.2–1)
WBC URINE: >100 /HPF (ref 0–4)

## 2024-09-11 ENCOUNTER — OUTSIDE SERVICES (OUTPATIENT)
Dept: INTERNAL MEDICINE | Age: 79
End: 2024-09-11

## 2024-09-11 DIAGNOSIS — E78.5 HYPERLIPIDEMIA, UNSPECIFIED HYPERLIPIDEMIA TYPE: Primary | ICD-10-CM

## 2024-09-11 DIAGNOSIS — F32.9 MAJOR DEPRESSIVE DISORDER, REMISSION STATUS UNSPECIFIED, UNSPECIFIED WHETHER RECURRENT: ICD-10-CM

## 2024-09-11 DIAGNOSIS — E11.40 TYPE 2 DIABETES MELLITUS WITH DIABETIC NEUROPATHY, UNSPECIFIED WHETHER LONG TERM INSULIN USE (HCC): ICD-10-CM

## 2024-09-11 DIAGNOSIS — N18.31 STAGE 3A CHRONIC KIDNEY DISEASE (HCC): ICD-10-CM

## 2024-09-11 DIAGNOSIS — J45.909 UNCOMPLICATED ASTHMA, UNSPECIFIED ASTHMA SEVERITY, UNSPECIFIED WHETHER PERSISTENT: ICD-10-CM

## 2024-09-11 DIAGNOSIS — I25.10 CORONARY ARTERY DISEASE, UNSPECIFIED VESSEL OR LESION TYPE, UNSPECIFIED WHETHER ANGINA PRESENT, UNSPECIFIED WHETHER NATIVE OR TRANSPLANTED HEART: ICD-10-CM

## 2024-09-12 LAB
BACTERIA, URINE: ABNORMAL /HPF
BILIRUBIN, URINE: NEGATIVE
BLOOD, URINE: ABNORMAL
CASTS UA: ABNORMAL /LPF
CLARITY, UA: ABNORMAL
COLOR, UA: YELLOW
CRYSTALS, UA: ABNORMAL
GLUCOSE URINE: NEGATIVE MG/DL
KETONES, URINE: NEGATIVE MG/DL
LEUKOCYTE ESTERASE, URINE: ABNORMAL
NITRITE, URINE: NEGATIVE
PH, URINE: 6.5 (ref 5–8.5)
PROTEIN UA: ABNORMAL MG/DL
RBC URINE: ABNORMAL /HPF (ref 0–2)
SPECIFIC GRAVITY UA: 1.01 MG/DL (ref 1–1.03)
SQUAMOUS EPITHELIAL: ABNORMAL /HPF
UROBILINOGEN, URINE: 0.2 MG/DL (ref 0.2–1)
WBC URINE: ABNORMAL /HPF (ref 0–4)

## 2024-10-09 ENCOUNTER — OUTSIDE SERVICES (OUTPATIENT)
Dept: INTERNAL MEDICINE | Age: 79
End: 2024-10-09

## 2024-10-09 DIAGNOSIS — F32.9 MAJOR DEPRESSIVE DISORDER, REMISSION STATUS UNSPECIFIED, UNSPECIFIED WHETHER RECURRENT: ICD-10-CM

## 2024-10-09 DIAGNOSIS — J45.909 UNCOMPLICATED ASTHMA, UNSPECIFIED ASTHMA SEVERITY, UNSPECIFIED WHETHER PERSISTENT: ICD-10-CM

## 2024-10-09 DIAGNOSIS — I25.10 CORONARY ARTERY DISEASE, UNSPECIFIED VESSEL OR LESION TYPE, UNSPECIFIED WHETHER ANGINA PRESENT, UNSPECIFIED WHETHER NATIVE OR TRANSPLANTED HEART: ICD-10-CM

## 2024-10-09 DIAGNOSIS — N18.31 STAGE 3A CHRONIC KIDNEY DISEASE (HCC): ICD-10-CM

## 2024-10-09 DIAGNOSIS — H26.9 CATARACT, UNSPECIFIED CATARACT TYPE, UNSPECIFIED LATERALITY: ICD-10-CM

## 2024-10-09 DIAGNOSIS — E78.5 HYPERLIPIDEMIA, UNSPECIFIED HYPERLIPIDEMIA TYPE: Primary | ICD-10-CM

## 2024-10-09 DIAGNOSIS — E11.40 TYPE 2 DIABETES MELLITUS WITH DIABETIC NEUROPATHY, UNSPECIFIED WHETHER LONG TERM INSULIN USE (HCC): ICD-10-CM

## 2024-10-16 NOTE — PROGRESS NOTES
Date of Encounter: 10/9/2024  Patient:  Herminia Laura  YOB: 1945      Chief Complaint: Follow up on chronic medical conditions    History of Present Illness:  No issues reported by nursing staff. No reported complaints.  She sits in her chair in no distress.  Says that she has been painting recently    Past Medical/Surgical/Social History: Per admission note    Physical Exam  Vitals: Blood Pressure 1 120/64. Pulse 82. Temperature 97.3. Respirations 16  Constitutional: No acute distress  Eyes: Sclerae nonicteric. No lid lag or proptosis  HENT: External ears normal. No external lesions on the nose. No lesions on the lips  Neck: No gross masses. Trachea visibly midline  Respiratory: Good air entry bilaterally. No wheezing or crackles  Cardiovascular: Normal S1-S2. No murmurs  Gastrointestinal: No visible masses.   Skin: No abnormal rashes. No digital cyanosis  Neurologic: Cranial nerves grossly intact    Assessments   Diagnosis Orders   1. Hyperlipidemia, unspecified hyperlipidemia type        2. Stage 3a chronic kidney disease (HCC)        3. Type 2 diabetes mellitus with diabetic neuropathy, unspecified whether long term insulin use (HCC)        4. Uncomplicated asthma, unspecified asthma severity, unspecified whether persistent        5. Major depressive disorder, remission status unspecified, unspecified whether recurrent        6. Cataract, unspecified cataract type, unspecified laterality        7. Coronary artery disease, unspecified vessel or lesion type, unspecified whether angina present, unspecified whether native or transplanted heart            Plan  I will be notified of any changes to the patient's condition. Unless otherwise noted, a gradual dose reduction in medications is not indicated at this time.      This note has been created using the Epic electronic health record, and dictated in part by [employer] One dictation system. Despite the documenting physician's best

## 2024-11-08 ENCOUNTER — OUTSIDE SERVICES (OUTPATIENT)
Dept: INTERNAL MEDICINE | Age: 79
End: 2024-11-08

## 2024-11-08 DIAGNOSIS — E78.5 HYPERLIPIDEMIA, UNSPECIFIED HYPERLIPIDEMIA TYPE: Primary | ICD-10-CM

## 2024-11-08 DIAGNOSIS — N18.31 STAGE 3A CHRONIC KIDNEY DISEASE (HCC): ICD-10-CM

## 2024-11-08 DIAGNOSIS — J45.909 UNCOMPLICATED ASTHMA, UNSPECIFIED ASTHMA SEVERITY, UNSPECIFIED WHETHER PERSISTENT: ICD-10-CM

## 2024-11-08 DIAGNOSIS — H26.9 CATARACT, UNSPECIFIED CATARACT TYPE, UNSPECIFIED LATERALITY: ICD-10-CM

## 2024-11-08 DIAGNOSIS — I25.10 CORONARY ARTERY DISEASE, UNSPECIFIED VESSEL OR LESION TYPE, UNSPECIFIED WHETHER ANGINA PRESENT, UNSPECIFIED WHETHER NATIVE OR TRANSPLANTED HEART: ICD-10-CM

## 2024-11-08 DIAGNOSIS — E11.40 TYPE 2 DIABETES MELLITUS WITH DIABETIC NEUROPATHY, UNSPECIFIED WHETHER LONG TERM INSULIN USE (HCC): ICD-10-CM

## 2024-11-08 DIAGNOSIS — F32.9 MAJOR DEPRESSIVE DISORDER, REMISSION STATUS UNSPECIFIED, UNSPECIFIED WHETHER RECURRENT: ICD-10-CM

## 2024-11-08 NOTE — PROGRESS NOTES
Date of Encounter: 11/6/2024  Patient:  Herminia Laura  YOB: 1945      Chief Complaint: Follow up on chronic medical conditions    History of Present Illness:  No issues reported by nursing staff. No reported complaints.      Past Medical/Surgical/Social History: Per admission note    Physical Exam  Vitals: Blood Pressure 121/72. Pulse 70. Temperature 97.8. Respirations 18  Constitutional: No acute distress  Eyes: Sclerae nonicteric. No lid lag or proptosis  HENT: External ears normal. No external lesions on the nose. No lesions on the lips  Neck: No gross masses. Trachea visibly midline  Respiratory: Good air entry bilaterally. No wheezing or crackles  Cardiovascular: Normal S1-S2. No murmurs  Gastrointestinal: No visible masses.   Skin: No abnormal rashes. No digital cyanosis  Neurologic: Cranial nerves grossly intact    Assessments   Diagnosis Orders   1. Hyperlipidemia, unspecified hyperlipidemia type        2. Stage 3a chronic kidney disease (HCC)        3. Type 2 diabetes mellitus with diabetic neuropathy, unspecified whether long term insulin use (HCC)        4. Uncomplicated asthma, unspecified asthma severity, unspecified whether persistent        5. Major depressive disorder, remission status unspecified, unspecified whether recurrent        6. Cataract, unspecified cataract type, unspecified laterality        7. Coronary artery disease, unspecified vessel or lesion type, unspecified whether angina present, unspecified whether native or transplanted heart            Plan  I will be notified of any changes to the patient's condition. Unless otherwise noted, a gradual dose reduction in medications is not indicated at this time.      This note has been created using the Epic electronic health record, and dictated in part by [employer] One dictation system. Despite the documenting physician's best efforts, there may be errors in spelling, grammar or syntax.

## 2024-11-13 LAB — HBA1C MFR BLD: 7 % (ref 4.4–6.4)

## 2024-12-04 ENCOUNTER — OUTSIDE SERVICES (OUTPATIENT)
Dept: INTERNAL MEDICINE | Age: 79
End: 2024-12-04

## 2024-12-04 DIAGNOSIS — N18.9 CHRONIC KIDNEY DISEASE, UNSPECIFIED CKD STAGE: ICD-10-CM

## 2024-12-04 DIAGNOSIS — E78.5 HYPERLIPIDEMIA, UNSPECIFIED HYPERLIPIDEMIA TYPE: Primary | ICD-10-CM

## 2024-12-04 DIAGNOSIS — I25.10 CORONARY ARTERY DISEASE, UNSPECIFIED VESSEL OR LESION TYPE, UNSPECIFIED WHETHER ANGINA PRESENT, UNSPECIFIED WHETHER NATIVE OR TRANSPLANTED HEART: ICD-10-CM

## 2024-12-04 DIAGNOSIS — N18.31 STAGE 3A CHRONIC KIDNEY DISEASE (HCC): ICD-10-CM

## 2024-12-04 DIAGNOSIS — E11.40 TYPE 2 DIABETES MELLITUS WITH DIABETIC NEUROPATHY, UNSPECIFIED WHETHER LONG TERM INSULIN USE (HCC): ICD-10-CM

## 2024-12-04 DIAGNOSIS — J45.909 UNCOMPLICATED ASTHMA, UNSPECIFIED ASTHMA SEVERITY, UNSPECIFIED WHETHER PERSISTENT: ICD-10-CM

## 2024-12-04 DIAGNOSIS — F32.9 MAJOR DEPRESSIVE DISORDER, REMISSION STATUS UNSPECIFIED, UNSPECIFIED WHETHER RECURRENT: ICD-10-CM

## 2024-12-05 NOTE — PROGRESS NOTES
dictation system. Despite the documenting physician's best efforts, there may be errors in spelling, grammar or syntax.

## 2024-12-11 ENCOUNTER — OUTSIDE SERVICES (OUTPATIENT)
Dept: INTERNAL MEDICINE | Age: 79
End: 2024-12-11

## 2024-12-11 DIAGNOSIS — E11.40 TYPE 2 DIABETES MELLITUS WITH DIABETIC NEUROPATHY, UNSPECIFIED WHETHER LONG TERM INSULIN USE (HCC): ICD-10-CM

## 2024-12-11 DIAGNOSIS — E78.5 HYPERLIPIDEMIA, UNSPECIFIED HYPERLIPIDEMIA TYPE: Primary | ICD-10-CM

## 2024-12-11 DIAGNOSIS — F32.9 MAJOR DEPRESSIVE DISORDER, REMISSION STATUS UNSPECIFIED, UNSPECIFIED WHETHER RECURRENT: ICD-10-CM

## 2024-12-11 DIAGNOSIS — I25.10 CORONARY ARTERY DISEASE, UNSPECIFIED VESSEL OR LESION TYPE, UNSPECIFIED WHETHER ANGINA PRESENT, UNSPECIFIED WHETHER NATIVE OR TRANSPLANTED HEART: ICD-10-CM

## 2024-12-11 DIAGNOSIS — N18.31 STAGE 3A CHRONIC KIDNEY DISEASE (HCC): ICD-10-CM

## 2024-12-11 DIAGNOSIS — J45.909 UNCOMPLICATED ASTHMA, UNSPECIFIED ASTHMA SEVERITY, UNSPECIFIED WHETHER PERSISTENT: ICD-10-CM

## 2024-12-11 DIAGNOSIS — N18.9 CHRONIC KIDNEY DISEASE, UNSPECIFIED CKD STAGE: ICD-10-CM

## 2024-12-12 LAB
FLU A ANTIGEN: NEGATIVE
FLU B ANTIGEN: NEGATIVE

## 2024-12-12 NOTE — PROGRESS NOTES
Date of Encounter: 12/11/2024  Patient:  Herminia Laura  YOB: 1945      Chief Complaint:  Myalgia    History of Present Illness:  She reports that she says that she feels like she has a fever, and has myalgias.  Denies chest pain, shortness of breath, cough, sinus pressure, nausea, vomiting, dysuria, diarrhea.  She was checked for COVID and was negative.    Past Medical/Surgical/Social History: Per admission note    Physical Exam  Vitals: Blood Pressure 124/70. Pulse 72. Temperature 97.6. Respirations 16  Constitutional: No acute distress  Eyes: Sclerae nonicteric. No lid lag or proptosis  HENT: External ears normal. No external lesions on the nose. No lesions on the lips  Neck: No gross masses. Trachea visibly midline  Respiratory: Good air entry bilaterally. No wheezing or crackles  Cardiovascular: Normal S1-S2. No murmurs  Gastrointestinal: No visible masses.   Skin: No abnormal rashes. No digital cyanosis  Neurologic: Cranial nerves grossly intact    Assessments   Diagnosis Orders   1. Hyperlipidemia, unspecified hyperlipidemia type        2. Stage 3a chronic kidney disease (HCC)        3. Type 2 diabetes mellitus with diabetic neuropathy, unspecified whether long term insulin use (HCC)        4. Uncomplicated asthma, unspecified asthma severity, unspecified whether persistent        5. Major depressive disorder, remission status unspecified, unspecified whether recurrent        6. Coronary artery disease, unspecified vessel or lesion type, unspecified whether angina present, unspecified whether native or transplanted heart        7. Chronic kidney disease, unspecified CKD stage            Plan  Will test for influenza.  Reassess afterwards  I will be notified of any changes to the patient's condition. Unless otherwise noted, a gradual dose reduction in medications is not indicated at this time.      This note has been created using the Epic electronic health record, and dictated in part

## 2024-12-18 LAB — RSV ANTIGEN: NEGATIVE

## 2024-12-21 LAB
BACTERIA, URINE: ABNORMAL /HPF
BILIRUBIN, URINE: NEGATIVE
BLOOD, URINE: ABNORMAL
CASTS UA: ABNORMAL /LPF
CLARITY, UA: ABNORMAL
COLOR, UA: YELLOW
CRYSTALS, UA: ABNORMAL
GLUCOSE URINE: NEGATIVE MG/DL
KETONES, URINE: NEGATIVE MG/DL
LEUKOCYTE ESTERASE, URINE: ABNORMAL
NITRITE, URINE: NEGATIVE
PH, URINE: 6.5 (ref 5–8.5)
PROTEIN UA: NEGATIVE MG/DL
RBC URINE: ABNORMAL /HPF (ref 0–2)
SPECIFIC GRAVITY UA: 1.01 E.U./DL (ref 1–1.03)
SQUAMOUS EPITHELIAL: ABNORMAL /HPF
UROBILINOGEN, URINE: 0.2 MG/DL (ref 0.2–1)
WBC URINE: ABNORMAL /HPF (ref 0–4)

## 2025-01-08 ENCOUNTER — OUTSIDE SERVICES (OUTPATIENT)
Dept: INTERNAL MEDICINE | Age: 80
End: 2025-01-08

## 2025-01-08 DIAGNOSIS — F32.9 MAJOR DEPRESSIVE DISORDER, REMISSION STATUS UNSPECIFIED, UNSPECIFIED WHETHER RECURRENT: ICD-10-CM

## 2025-01-08 DIAGNOSIS — E78.5 HYPERLIPIDEMIA, UNSPECIFIED HYPERLIPIDEMIA TYPE: Primary | ICD-10-CM

## 2025-01-08 DIAGNOSIS — I25.10 CORONARY ARTERY DISEASE, UNSPECIFIED VESSEL OR LESION TYPE, UNSPECIFIED WHETHER ANGINA PRESENT, UNSPECIFIED WHETHER NATIVE OR TRANSPLANTED HEART: ICD-10-CM

## 2025-01-08 DIAGNOSIS — E11.40 TYPE 2 DIABETES MELLITUS WITH DIABETIC NEUROPATHY, UNSPECIFIED WHETHER LONG TERM INSULIN USE (HCC): ICD-10-CM

## 2025-01-08 DIAGNOSIS — N18.31 STAGE 3A CHRONIC KIDNEY DISEASE (HCC): ICD-10-CM

## 2025-01-08 DIAGNOSIS — J45.909 UNCOMPLICATED ASTHMA, UNSPECIFIED ASTHMA SEVERITY, UNSPECIFIED WHETHER PERSISTENT: ICD-10-CM

## 2025-01-09 NOTE — PROGRESS NOTES
Date of Encounter: 1/8/2025  Patient:  Herminia Laura  YOB: 1945      Chief Complaint: Follow up on chronic medical conditions    History of Present Illness:  No issues reported by nursing staff. No reported complaints. She sits in her chair in no distress    Past Medical/Surgical/Social History: Per admission note    Physical Exam  Vitals: Blood Pressure 110/55. Pulse 75. Temperature 97.6. Respirations 18  Constitutional: No acute distress  Eyes: Sclerae nonicteric. No lid lag or proptosis  HENT: External ears normal. No external lesions on the nose. No lesions on the lips  Neck: No gross masses. Trachea visibly midline  Respiratory: Good air entry bilaterally. No wheezing or crackles  Cardiovascular: Normal S1-S2. No murmurs  Gastrointestinal: No visible masses.   Skin: No abnormal rashes. No digital cyanosis  Neurologic: Cranial nerves grossly intact    Assessments   Diagnosis Orders   1. Hyperlipidemia, unspecified hyperlipidemia type        2. Type 2 diabetes mellitus with diabetic neuropathy, unspecified whether long term insulin use (HCC)        3. Stage 3a chronic kidney disease (HCC)        4. Uncomplicated asthma, unspecified asthma severity, unspecified whether persistent        5. Major depressive disorder, remission status unspecified, unspecified whether recurrent        6. Coronary artery disease, unspecified vessel or lesion type, unspecified whether angina present, unspecified whether native or transplanted heart            Plan  I will be notified of any changes to the patient's condition. Unless otherwise noted, a gradual dose reduction in medications is not indicated at this time.      This note has been created using the Epic electronic health record, and dictated in part by [employer] One dictation system. Despite the documenting physician's best efforts, there may be errors in spelling, grammar or syntax.

## 2025-01-22 ENCOUNTER — OUTSIDE SERVICES (OUTPATIENT)
Dept: INTERNAL MEDICINE | Age: 80
End: 2025-01-22

## 2025-01-22 DIAGNOSIS — J45.909 UNCOMPLICATED ASTHMA, UNSPECIFIED ASTHMA SEVERITY, UNSPECIFIED WHETHER PERSISTENT: ICD-10-CM

## 2025-01-22 DIAGNOSIS — N18.31 STAGE 3A CHRONIC KIDNEY DISEASE (HCC): ICD-10-CM

## 2025-01-22 DIAGNOSIS — F32.9 MAJOR DEPRESSIVE DISORDER, REMISSION STATUS UNSPECIFIED, UNSPECIFIED WHETHER RECURRENT: ICD-10-CM

## 2025-01-22 DIAGNOSIS — E78.5 HYPERLIPIDEMIA, UNSPECIFIED HYPERLIPIDEMIA TYPE: Primary | ICD-10-CM

## 2025-01-22 DIAGNOSIS — I25.10 CORONARY ARTERY DISEASE, UNSPECIFIED VESSEL OR LESION TYPE, UNSPECIFIED WHETHER ANGINA PRESENT, UNSPECIFIED WHETHER NATIVE OR TRANSPLANTED HEART: ICD-10-CM

## 2025-01-22 DIAGNOSIS — N18.9 CHRONIC KIDNEY DISEASE, UNSPECIFIED CKD STAGE: ICD-10-CM

## 2025-01-22 DIAGNOSIS — E11.40 TYPE 2 DIABETES MELLITUS WITH DIABETIC NEUROPATHY, UNSPECIFIED WHETHER LONG TERM INSULIN USE (HCC): ICD-10-CM

## 2025-01-24 LAB
FLU A ANTIGEN: NEGATIVE
FLU B ANTIGEN: NEGATIVE

## 2025-01-25 NOTE — PROGRESS NOTES
Date of Encounter: 1/22/2025  Patient:  Herminia Laura  YOB: 1945      Chief Complaint:  URI    History of Present Illness:  Reports that she has been having nasal congestion, cough that has been over the last several days.  Denies fever, chills.  Denies chest pain or shortness of breath.    Past Medical/Surgical/Social History: Per admission note    Physical Exam  Vitals: Blood Pressure 110/55. Pulse 75. Temperature 97.6. Respirations 18  Constitutional: No acute distress  Eyes: Sclerae nonicteric. No lid lag or proptosis  HENT: External ears normal. No external lesions on the nose. No lesions on the lips  Neck: No gross masses. Trachea visibly midline  Respiratory: Good air entry bilaterally. No wheezing or crackles  Cardiovascular: Normal S1-S2. No murmurs  Gastrointestinal: No visible masses.   Skin: No abnormal rashes. No digital cyanosis  Neurologic: Cranial nerves grossly intact    Assessments   Diagnosis Orders   1. Hyperlipidemia, unspecified hyperlipidemia type        2. Type 2 diabetes mellitus with diabetic neuropathy, unspecified whether long term insulin use (HCC)        3. Stage 3a chronic kidney disease (HCC)        4. Uncomplicated asthma, unspecified asthma severity, unspecified whether persistent        5. Major depressive disorder, remission status unspecified, unspecified whether recurrent        6. Coronary artery disease, unspecified vessel or lesion type, unspecified whether angina present, unspecified whether native or transplanted heart        7. Chronic kidney disease, unspecified CKD stage            Plan  Will order COVID and flu swab.  Reassess afterwards.  I will be notified of any changes to the patient's condition. Unless otherwise noted, a gradual dose reduction in medications is not indicated at this time.      This note has been created using the Epic electronic health record, and dictated in part by [employer] One dictation system. Despite the

## 2025-02-04 LAB
ANION GAP SERPL CALCULATED.3IONS-SCNC: 8.7 MMOL/L (ref 3–11)
BASOPHILS ABSOLUTE: 0.04 X10E3/?L (ref 0–0.3)
BASOPHILS RELATIVE PERCENT: 0.58 % (ref 0–3)
BUN BLDV-MCNC: 16 MG/DL (ref 7–17)
CALCIUM SERPL-MCNC: 8.6 MG/DL (ref 8.4–10.2)
CHLORIDE BLD-SCNC: 95 MMOL/L (ref 98–120)
CO2: 29 MMOL/L (ref 22–31)
CREAT SERPL-MCNC: 0.8 MG/DL (ref 0.5–1)
EOSINOPHILS ABSOLUTE: 0.41 X10E3/?L (ref 0–1.1)
EOSINOPHILS RELATIVE PERCENT: 5.4 % (ref 0–10)
GFR, ESTIMATED: > 60
GLUCOSE: 198 MG/DL (ref 65–105)
HCT VFR BLD CALC: 31.3 % (ref 37–47)
HEMOGLOBIN: 9.8 G/DL (ref 12–16)
LYMPHOCYTES ABSOLUTE: 1.48 X10E3/?L (ref 1–5.5)
LYMPHOCYTES RELATIVE PERCENT: 19.63 % (ref 20–51.1)
MCH RBC QN AUTO: 29.1 PG (ref 28.5–32.5)
MCHC RBC AUTO-ENTMCNC: 31.2 G/DL (ref 32–37)
MCV RBC AUTO: 93.4 FL (ref 80–94)
MONOCYTES ABSOLUTE: 0.77 X10E3/?L (ref 0.1–1)
MONOCYTES RELATIVE PERCENT: 10.15 % (ref 1.7–9.3)
NEUTROPHILS ABSOLUTE: 4.85 X10E3/?L (ref 2–8.1)
NEUTROPHILS RELATIVE PERCENT: 64.24 % (ref 42.2–75.2)
PDW BLD-RTO: 13.2 % (ref 8.5–15.5)
PLATELET # BLD: 299.4 THOU/MM3 (ref 130–400)
POTASSIUM SERPL-SCNC: 4.7 MMOL/L (ref 3.6–5)
RBC # BLD: 3.36 M/UL (ref 4.2–5.4)
SODIUM BLD-SCNC: 128 MMOL/L (ref 135–145)
TSH REFLEX FT4: 1.96 MIU/ML (ref 0.49–4.67)
WBC # BLD: 7.5 THOU/ML3 (ref 4.8–10.8)

## 2025-02-10 LAB
BACTERIA, URINE: ABNORMAL /HPF
BILIRUBIN, URINE: NEGATIVE
BLOOD, URINE: ABNORMAL
CASTS UA: ABNORMAL /LPF
CLARITY, UA: ABNORMAL
COLOR, UA: ABNORMAL
CRYSTALS, UA: ABNORMAL
GLUCOSE URINE: NEGATIVE MG/DL
KETONES, URINE: NEGATIVE MG/DL
LEUKOCYTE ESTERASE, URINE: ABNORMAL
NITRITE, URINE: NEGATIVE
PH, URINE: 6.5 (ref 5–8.5)
PROTEIN UA: NEGATIVE MG/DL
RBC URINE: ABNORMAL /HPF (ref 0–2)
SPECIFIC GRAVITY UA: 1.01 E.U./DL (ref 1–1.03)
SQUAMOUS EPITHELIAL: ABNORMAL /HPF
UROBILINOGEN, URINE: 0.2 MG/DL (ref 0.2–1)
WBC URINE: ABNORMAL /HPF (ref 0–4)